# Patient Record
Sex: FEMALE | Race: WHITE | NOT HISPANIC OR LATINO | Employment: OTHER | URBAN - METROPOLITAN AREA
[De-identification: names, ages, dates, MRNs, and addresses within clinical notes are randomized per-mention and may not be internally consistent; named-entity substitution may affect disease eponyms.]

---

## 2023-05-16 PROBLEM — S83.249A MEDIAL MENISCUS TEAR: Status: ACTIVE | Noted: 2023-05-16

## 2023-05-16 PROBLEM — M54.50 PAIN, LOW BACK: Status: ACTIVE | Noted: 2023-05-16

## 2023-05-16 PROBLEM — N95.2 VAGINAL ATROPHY: Status: ACTIVE | Noted: 2023-05-16

## 2023-05-16 PROBLEM — R10.2 PELVIC PAIN IN FEMALE: Status: ACTIVE | Noted: 2023-05-16

## 2023-05-16 PROBLEM — M25.461 EFFUSION, RIGHT KNEE: Status: ACTIVE | Noted: 2023-05-16

## 2023-05-16 PROBLEM — M17.11 PRIMARY LOCALIZED OSTEOARTHROSIS OF RIGHT LOWER LEG: Status: ACTIVE | Noted: 2023-05-16

## 2023-05-16 PROBLEM — N81.89 PELVIC FLOOR WEAKNESS: Status: ACTIVE | Noted: 2023-05-16

## 2023-05-16 PROBLEM — M54.12 CERVICAL RADICULAR PAIN: Status: ACTIVE | Noted: 2023-05-16

## 2023-05-16 PROBLEM — R79.89 LOW VITAMIN D LEVEL: Status: ACTIVE | Noted: 2023-05-16

## 2023-05-16 PROBLEM — L30.9 ECZEMA: Status: ACTIVE | Noted: 2023-05-16

## 2023-05-16 PROBLEM — N39.41 URGE INCONTINENCE: Status: ACTIVE | Noted: 2023-05-16

## 2023-05-16 RX ORDER — TRIAMCINOLONE ACETONIDE 5 MG/G
CREAM TOPICAL
COMMUNITY
Start: 2021-08-20 | End: 2023-08-22 | Stop reason: SDUPTHER

## 2023-05-16 RX ORDER — OXYBUTYNIN CHLORIDE 10 MG/1
1 TABLET, EXTENDED RELEASE ORAL DAILY
COMMUNITY
Start: 2021-05-13 | End: 2023-11-20

## 2023-05-16 RX ORDER — ESTRADIOL 0.1 MG/G
CREAM VAGINAL
COMMUNITY
Start: 2021-05-13

## 2023-05-22 ENCOUNTER — OFFICE VISIT (OUTPATIENT)
Dept: PRIMARY CARE | Facility: CLINIC | Age: 63
End: 2023-05-22
Payer: COMMERCIAL

## 2023-05-22 VITALS
DIASTOLIC BLOOD PRESSURE: 70 MMHG | BODY MASS INDEX: 21.99 KG/M2 | HEART RATE: 60 BPM | HEIGHT: 65 IN | SYSTOLIC BLOOD PRESSURE: 116 MMHG | OXYGEN SATURATION: 97 % | WEIGHT: 132 LBS

## 2023-05-22 DIAGNOSIS — M79.89 LEFT LEG SWELLING: ICD-10-CM

## 2023-05-22 DIAGNOSIS — M54.50 CHRONIC MIDLINE LOW BACK PAIN, UNSPECIFIED WHETHER SCIATICA PRESENT: ICD-10-CM

## 2023-05-22 DIAGNOSIS — Z00.00 PREVENTATIVE HEALTH CARE: ICD-10-CM

## 2023-05-22 DIAGNOSIS — R79.89 LOW VITAMIN D LEVEL: ICD-10-CM

## 2023-05-22 DIAGNOSIS — M25.50 POLYARTHRALGIA: ICD-10-CM

## 2023-05-22 DIAGNOSIS — G89.29 CHRONIC MIDLINE LOW BACK PAIN, UNSPECIFIED WHETHER SCIATICA PRESENT: ICD-10-CM

## 2023-05-22 DIAGNOSIS — R25.2 MUSCLE CRAMPING: ICD-10-CM

## 2023-05-22 DIAGNOSIS — M25.552 BILATERAL HIP PAIN: Primary | ICD-10-CM

## 2023-05-22 DIAGNOSIS — M25.551 BILATERAL HIP PAIN: Primary | ICD-10-CM

## 2023-05-22 PROCEDURE — 99214 OFFICE O/P EST MOD 30 MIN: CPT | Performed by: FAMILY MEDICINE

## 2023-05-22 PROCEDURE — 1036F TOBACCO NON-USER: CPT | Performed by: FAMILY MEDICINE

## 2023-05-22 ASSESSMENT — PATIENT HEALTH QUESTIONNAIRE - PHQ9
SUM OF ALL RESPONSES TO PHQ9 QUESTIONS 1 AND 2: 0
1. LITTLE INTEREST OR PLEASURE IN DOING THINGS: NOT AT ALL
2. FEELING DOWN, DEPRESSED OR HOPELESS: NOT AT ALL

## 2023-05-22 NOTE — PROGRESS NOTES
"Subjective   Carlotta Mitchell is a 62 y.o. female who presents for Follow-up (Left calf swelling x 8 days /Went to ER ( was out of town ) - US negative for blood clot).    HPI  #) leg swelling   - never had this before  - left leg only   - went to the ER - US was neg for clot   - brother who is orthopedic, recemmned ER for US   - drank a lot of water and kept it elevated   - feeling like she was going to get a julio c horse the week leading up to it   - is having a lot of lower back and hip pains   - no numbness or pain in the legs     #) Incontinence and pelvic pain - no change in urination   - ovaries for RAD51 gene- Evelyn Ruggiero   - follows with uro/gyn  - also saw Dr Higgins     #) dermatitis   - cortisone cream is keeping stable.     #) lipoma on left forearm  - no numbness tingling or weakness     #) OA on the low back and knee  - follows with ortho and had PT     #) Preventive:  Smoker: never, quit more than 15 yrs ago   Etoh: occ  BMI: 22   BP: good   Fam h/o:  - Mom- alzheimer   - Dad- UC   - Siblings  Vaccinations:  - Flu- declined   - Tetanus- 2016   - Shingles- had   - Pneumonia - n/a til 64   - COVID- yes   PAP: 2019- due 2022  Mammogram: Feb 2021   Colonoscopy: Due in 2026   DEXA: at age 64   CT Cardiac score:   ASA 81:   Low Dose CT: n/a   HepC screen: now   Fasting blood work: now   Last eye exam: needs, lasik   Last dental Exam:  Diet:  Exercise: walks daily   Mood: good   Sleep: good   Occupation: electro plating   Supplements:    ROS was completed and all systems are negative with the exception of what was noted in the the HPI.     Objective     /70   Pulse 60   Ht 1.651 m (5' 5\")   Wt 59.9 kg (132 lb)   SpO2 97%   BMI 21.97 kg/m²      Physical Exam  Gen: A+O, NAD  HEENT: NC/AT, EOMI, no LAD, oropharynx clear, no edema or erythema, TM visualized and normal  CV: RRR, No M/R/G  Resp: CTAB, No W/R/C  Abd: Soft, NT/ND  Neuro: PERRL, moves all extremities equally, normal gait   Skin: No " rashes, no LE edema or varicosities   MSK: Grossly intact strength and reflexes; normal gait  Psych: Normal mood and affect    Assessment/Plan   Problem List Items Addressed This Visit    None    Repeat colonoscopy in 2026    Get updated mammogram with DR Whalen in July     Please get updated fasting blood work     Please get updated xrays of the low back and hips.     Continue the potassium and magnesium     Please set up a consultation with Dr Mohseni to look for deep varicose veins that could be addig to your swelling.   Please call [facility], Dr. Razieh Mohseni. There is an office in [place]. Call [phone]    Follow up in 3 months for a Physical and review all the testing done up to this point       Adrienne Omalley DO, MSMed, ABOM  [address]   [address]   [place] [address]  Ph. [phone]  Fx. [phone]

## 2023-05-22 NOTE — PATIENT INSTRUCTIONS
Repeat colonoscopy in 2026    Get updated mammogram with DR Whalen in July     Please get updated fasting blood work     Please get updated xrays of the low back and hips.     Continue the potassium and magnesium     Please set up a consultation with Dr Mohseni to look for deep varicose veins that could be addig to your swelling.   Please call [facility], Dr. Razieh Mohseni. There is an office in [place]. Call [phone]    Follow up in 3 months for a Physical and review all the testing done up to this point

## 2023-05-23 ENCOUNTER — LAB (OUTPATIENT)
Dept: LAB | Facility: LAB | Age: 63
End: 2023-05-23
Payer: COMMERCIAL

## 2023-05-23 DIAGNOSIS — M25.552 BILATERAL HIP PAIN: ICD-10-CM

## 2023-05-23 DIAGNOSIS — R25.2 MUSCLE CRAMPING: ICD-10-CM

## 2023-05-23 DIAGNOSIS — R79.89 LOW VITAMIN D LEVEL: ICD-10-CM

## 2023-05-23 DIAGNOSIS — M25.551 BILATERAL HIP PAIN: ICD-10-CM

## 2023-05-23 DIAGNOSIS — G89.29 CHRONIC MIDLINE LOW BACK PAIN, UNSPECIFIED WHETHER SCIATICA PRESENT: ICD-10-CM

## 2023-05-23 DIAGNOSIS — M54.50 CHRONIC MIDLINE LOW BACK PAIN, UNSPECIFIED WHETHER SCIATICA PRESENT: ICD-10-CM

## 2023-05-23 DIAGNOSIS — Z00.00 PREVENTATIVE HEALTH CARE: ICD-10-CM

## 2023-05-23 DIAGNOSIS — M25.50 POLYARTHRALGIA: ICD-10-CM

## 2023-05-23 LAB
ALANINE AMINOTRANSFERASE (SGPT) (U/L) IN SER/PLAS: 17 U/L (ref 7–45)
ALBUMIN (G/DL) IN SER/PLAS: 4.3 G/DL (ref 3.4–5)
ALKALINE PHOSPHATASE (U/L) IN SER/PLAS: 41 U/L (ref 33–136)
ANION GAP IN SER/PLAS: 12 MMOL/L (ref 10–20)
ASPARTATE AMINOTRANSFERASE (SGOT) (U/L) IN SER/PLAS: 20 U/L (ref 9–39)
BASOPHILS (10*3/UL) IN BLOOD BY AUTOMATED COUNT: 0.03 X10E9/L (ref 0–0.1)
BASOPHILS/100 LEUKOCYTES IN BLOOD BY AUTOMATED COUNT: 0.7 % (ref 0–2)
BILIRUBIN TOTAL (MG/DL) IN SER/PLAS: 0.7 MG/DL (ref 0–1.2)
CALCIDIOL (25 OH VITAMIN D3) (NG/ML) IN SER/PLAS: 49 NG/ML
CALCIUM (MG/DL) IN SER/PLAS: 9.1 MG/DL (ref 8.6–10.3)
CARBON DIOXIDE, TOTAL (MMOL/L) IN SER/PLAS: 30 MMOL/L (ref 21–32)
CHLORIDE (MMOL/L) IN SER/PLAS: 103 MMOL/L (ref 98–107)
CHOLESTEROL (MG/DL) IN SER/PLAS: 196 MG/DL (ref 0–199)
CHOLESTEROL IN HDL (MG/DL) IN SER/PLAS: 88 MG/DL
CHOLESTEROL/HDL RATIO: 2.2
CREATININE (MG/DL) IN SER/PLAS: 0.93 MG/DL (ref 0.5–1.05)
EOSINOPHILS (10*3/UL) IN BLOOD BY AUTOMATED COUNT: 0.26 X10E9/L (ref 0–0.7)
EOSINOPHILS/100 LEUKOCYTES IN BLOOD BY AUTOMATED COUNT: 6.5 % (ref 0–6)
ERYTHROCYTE DISTRIBUTION WIDTH (RATIO) BY AUTOMATED COUNT: 14.8 % (ref 11.5–14.5)
ERYTHROCYTE MEAN CORPUSCULAR HEMOGLOBIN CONCENTRATION (G/DL) BY AUTOMATED: 32.3 G/DL (ref 32–36)
ERYTHROCYTE MEAN CORPUSCULAR VOLUME (FL) BY AUTOMATED COUNT: 97 FL (ref 80–100)
ERYTHROCYTES (10*6/UL) IN BLOOD BY AUTOMATED COUNT: 3.82 X10E12/L (ref 4–5.2)
GFR FEMALE: 69 ML/MIN/1.73M2
GLUCOSE (MG/DL) IN SER/PLAS: 87 MG/DL (ref 74–99)
HEMATOCRIT (%) IN BLOOD BY AUTOMATED COUNT: 37.2 % (ref 36–46)
HEMOGLOBIN (G/DL) IN BLOOD: 12 G/DL (ref 12–16)
IMMATURE GRANULOCYTES/100 LEUKOCYTES IN BLOOD BY AUTOMATED COUNT: 0.7 % (ref 0–0.9)
LDL: 92 MG/DL (ref 0–99)
LEUKOCYTES (10*3/UL) IN BLOOD BY AUTOMATED COUNT: 4 X10E9/L (ref 4.4–11.3)
LYMPHOCYTES (10*3/UL) IN BLOOD BY AUTOMATED COUNT: 1.78 X10E9/L (ref 1.2–4.8)
LYMPHOCYTES/100 LEUKOCYTES IN BLOOD BY AUTOMATED COUNT: 44.2 % (ref 13–44)
MAGNESIUM (MG/DL) IN SER/PLAS: 1.93 MG/DL (ref 1.6–2.4)
MONOCYTES (10*3/UL) IN BLOOD BY AUTOMATED COUNT: 0.74 X10E9/L (ref 0.1–1)
MONOCYTES/100 LEUKOCYTES IN BLOOD BY AUTOMATED COUNT: 18.4 % (ref 2–10)
NEUTROPHILS (10*3/UL) IN BLOOD BY AUTOMATED COUNT: 1.19 X10E9/L (ref 1.2–7.7)
NEUTROPHILS/100 LEUKOCYTES IN BLOOD BY AUTOMATED COUNT: 29.5 % (ref 40–80)
PLATELETS (10*3/UL) IN BLOOD AUTOMATED COUNT: 253 X10E9/L (ref 150–450)
POTASSIUM (MMOL/L) IN SER/PLAS: 4.8 MMOL/L (ref 3.5–5.3)
PROTEIN TOTAL: 6.3 G/DL (ref 6.4–8.2)
RHEUMATOID FACTOR (IU/ML) IN SERUM OR PLASMA: 11 IU/ML (ref 0–15)
SODIUM (MMOL/L) IN SER/PLAS: 140 MMOL/L (ref 136–145)
THYROTROPIN (MIU/L) IN SER/PLAS BY DETECTION LIMIT <= 0.05 MIU/L: 1.38 MIU/L (ref 0.44–3.98)
TRIGLYCERIDE (MG/DL) IN SER/PLAS: 82 MG/DL (ref 0–149)
UREA NITROGEN (MG/DL) IN SER/PLAS: 20 MG/DL (ref 6–23)
VLDL: 16 MG/DL (ref 0–40)

## 2023-05-23 PROCEDURE — 80053 COMPREHEN METABOLIC PANEL: CPT

## 2023-05-23 PROCEDURE — 81381 HLA I TYPING 1 ALLELE HR: CPT

## 2023-05-23 PROCEDURE — 84443 ASSAY THYROID STIM HORMONE: CPT

## 2023-05-23 PROCEDURE — 86038 ANTINUCLEAR ANTIBODIES: CPT

## 2023-05-23 PROCEDURE — 82306 VITAMIN D 25 HYDROXY: CPT

## 2023-05-23 PROCEDURE — 86431 RHEUMATOID FACTOR QUANT: CPT

## 2023-05-23 PROCEDURE — 85025 COMPLETE CBC W/AUTO DIFF WBC: CPT

## 2023-05-23 PROCEDURE — 80061 LIPID PANEL: CPT

## 2023-05-23 PROCEDURE — 83735 ASSAY OF MAGNESIUM: CPT

## 2023-05-23 PROCEDURE — 36415 COLL VENOUS BLD VENIPUNCTURE: CPT

## 2023-05-24 LAB — ANTI-NUCLEAR ANTIBODY (ANA): NEGATIVE

## 2023-05-25 LAB — HLAB27 TYPING: NEGATIVE

## 2023-08-22 ENCOUNTER — OFFICE VISIT (OUTPATIENT)
Dept: PRIMARY CARE | Facility: CLINIC | Age: 63
End: 2023-08-22
Payer: COMMERCIAL

## 2023-08-22 VITALS
BODY MASS INDEX: 21.63 KG/M2 | DIASTOLIC BLOOD PRESSURE: 72 MMHG | SYSTOLIC BLOOD PRESSURE: 118 MMHG | WEIGHT: 130 LBS | HEART RATE: 64 BPM | OXYGEN SATURATION: 98 %

## 2023-08-22 DIAGNOSIS — L30.9 ECZEMA, UNSPECIFIED TYPE: Primary | ICD-10-CM

## 2023-08-22 DIAGNOSIS — F51.01 PRIMARY INSOMNIA: ICD-10-CM

## 2023-08-22 PROCEDURE — 99214 OFFICE O/P EST MOD 30 MIN: CPT | Performed by: FAMILY MEDICINE

## 2023-08-22 PROCEDURE — 1036F TOBACCO NON-USER: CPT | Performed by: FAMILY MEDICINE

## 2023-08-22 RX ORDER — TRAZODONE HYDROCHLORIDE 50 MG/1
50 TABLET ORAL NIGHTLY PRN
Qty: 30 TABLET | Refills: 5 | Status: SHIPPED | OUTPATIENT
Start: 2023-08-22 | End: 2023-11-08 | Stop reason: ALTCHOICE

## 2023-08-22 RX ORDER — TRIAMCINOLONE ACETONIDE 5 MG/G
CREAM TOPICAL DAILY PRN
Qty: 15 G | Refills: 1 | Status: SHIPPED | OUTPATIENT
Start: 2023-08-22

## 2023-08-22 NOTE — PROGRESS NOTES
Subjective   Carlotta Mitchell is a 62 y.o. female who presents for Follow-up (3 month follow up).    HPI  Father with SLE     #) leg swelling - better  - had vascular studies and were good     #) Incontinence and pelvic pain - no change in urination   - on oxybutinin   - ovaries for RAD51 gene- Evelyn Ruggiero   - follows with uro/gyn  - also saw Dr Higgins     #) dermatitis   - cortisone cream is keeping stable.     #) lipoma on left forearm  - no numbness tingling or weakness     #) OA on the low back and knee  - follows with ortho and had PT   - trying to do home exercises / stretches   - Ibuprofen as needed, always with golf   - xrays on on 5/22/23 - No acute findings of the lumbar spine and hips. Mild degenerative changes of the lumbar spine.    #) Preventive:  Smoker: never, quit more than 15 yrs ago   Etoh: occ  BMI: 22   BP: good   Fam h/o:  - Mom- alzheimer   - Dad- UC   - Siblings  Vaccinations:  - Flu- declined   - Tetanus- 2016   - Shingles- had   - Pneumonia - n/a til 64   - COVID- yes   PAP: 2019- due 2022  Mammogram: Feb 2021   Colonoscopy: Due in 2026   DEXA: at age 64   CT Cardiac score:   ASA 81:   Low Dose CT: n/a   HepC screen: now   Fasting blood work: now   Last eye exam: needs, lasik   Last dental Exam:  Diet:  Exercise: walks daily   Mood: good   Sleep: good   Occupation: electro plating   Supplements:    ROS was completed and all systems are negative with the exception of what was noted in the the HPI.     Objective     /72   Pulse 64   Wt 59 kg (130 lb)   SpO2 98%   BMI 21.63 kg/m²      Physical Exam  Gen: A+O, NAD  HEENT: NC/AT, EOMI, no LAD, oropharynx clear, no edema or erythema, TM visualized and normal  CV: RRR, No M/R/G  Resp: CTAB, No W/R/C  Abd: Soft, NT/ND  Neuro: PERRL, moves all extremities equally, normal gait   Skin: No rashes, no LE edema or varicosities   MSK: Grossly intact strength and reflexes; normal gait  Psych: Normal mood and affect    Assessment/Plan    Problem List Items Addressed This Visit    None  Repeat colonoscopy in 2026    Mammo was good on 7/28/23, repeat yearly.     Labs from 5/23/23 reviewed...  Electrolytes look good (negative for diabetes), protein and kidney levels are a little reduced, please increase hydration and protein in diet. Normal liver and thyroid function, cholesterol looks great. Normal magnesium levels. All the autoimmune markers/ inflammatory markers were negative. Vitamin D looks good.     xrays on on 5/22/23 - No acute findings of the lumbar spine and hips. Mild degenerative changes of the lumbar spine.    Continue the potassium and magnesium     Try to drink one Protein Water per day or increase egg whites/ tuna in water/ chicken breast     Follow up in 6 months  for Yearly Physical or sooner a needed.     Adrienne Omalley DO, MSMed, ABOM  [address]   [address]   [place] [address]  Ph. [phone]  Fx. [phone]

## 2023-08-22 NOTE — PATIENT INSTRUCTIONS
Repeat colonoscopy in 2026    Mammo was good on 7/28/23, repeat yearly.     Labs from 5/23/23 reviewed...  Electrolytes look good (negative for diabetes), protein and kidney levels are a little reduced, please increase hydration and protein in diet. Normal liver and thyroid function, cholesterol looks great. Normal magnesium levels. All the autoimmune markers/ inflammatory markers were negative. Vitamin D looks good.     xrays on on 5/22/23 - No acute findings of the lumbar spine and hips. Mild degenerative changes of the lumbar spine.    Continue the potassium and magnesium     Please try the trazodone as needed for insomnia     Try to drink one Protein Water per day or increase egg whites/ tuna in water/ chicken breast     Follow up in 6 months  for Yearly Physical or sooner a needed.

## 2023-11-08 ENCOUNTER — OFFICE VISIT (OUTPATIENT)
Dept: OBSTETRICS AND GYNECOLOGY | Facility: CLINIC | Age: 63
End: 2023-11-08
Payer: COMMERCIAL

## 2023-11-08 VITALS
HEIGHT: 65 IN | SYSTOLIC BLOOD PRESSURE: 126 MMHG | BODY MASS INDEX: 21.16 KG/M2 | DIASTOLIC BLOOD PRESSURE: 64 MMHG | WEIGHT: 127 LBS

## 2023-11-08 DIAGNOSIS — Z01.419 WELL WOMAN EXAM: Primary | ICD-10-CM

## 2023-11-08 PROCEDURE — 1036F TOBACCO NON-USER: CPT | Performed by: OBSTETRICS & GYNECOLOGY

## 2023-11-08 PROCEDURE — 99396 PREV VISIT EST AGE 40-64: CPT | Performed by: OBSTETRICS & GYNECOLOGY

## 2023-11-08 ASSESSMENT — ENCOUNTER SYMPTOMS
CHILLS: 0
CONSTIPATION: 0
FEVER: 0
ABDOMINAL DISTENTION: 0
UNEXPECTED WEIGHT CHANGE: 0
SHORTNESS OF BREATH: 0
FREQUENCY: 0
APPETITE CHANGE: 0
NAUSEA: 0
SLEEP DISTURBANCE: 0
BACK PAIN: 0
ABDOMINAL PAIN: 0
COLOR CHANGE: 0
HEMATURIA: 0
DIARRHEA: 0
FLANK PAIN: 0
VOMITING: 0
BLOOD IN STOOL: 0
DYSURIA: 0
FATIGUE: 0

## 2023-11-08 ASSESSMENT — PAIN SCALES - GENERAL: PAINLEVEL: 0-NO PAIN

## 2023-11-08 NOTE — PROGRESS NOTES
History Of Present Illness  Routine Gyn Exam  Carlotta Mitchell here for routine WWE.  Pt is postmenopausal.  Denies spotting or bleeding.   S/p risk reducing BSO   Taking oxybutynin for OAB.  Has Rx for estradiol vaginal cream but not using regularly. Uses OTC lubricant as needed.    Current complaints: none.     New health issues or surgeries since last visit: none.  Exercise: regular, walking, stretching, aerobics.     Gynecologic History  Postmenopausal.  Sexually active: Yes with one male partner/ .  Last Pap:  wnl .   Last mammogram: .   Last Colonoscopy:  up to date.   Last DEXA: not yet needed.     Obstetric History  OB History    Para Term  AB Living   5 5 5     5   SAB IAB Ectopic Multiple Live Births           5      # Outcome Date GA Lbr Alec/2nd Weight Sex Delivery Anes PTL Lv   5 Term 93    M Vag-Spont   JAYSON   4 Term 89    M Vag-Spont   JAYSON   3 Term 88    M Vag-Spont   JAYSON   2 Term 87    M Vag-Spont   JAYSON   1 Term 82    F Vag-Spont   JAYSON        Past Medical History  She has a past medical history of Abnormal cytological findings in specimens from other organs, systems and tissues (2014), Chondromalacia patellae, unspecified knee (2016), Chronic sinusitis, unspecified (2016), Disorder of the skin and subcutaneous tissue, unspecified (2016), Effusion, unspecified knee (2016), Encounter for gynecological examination (general) (routine) without abnormal findings (2016), Encounter for gynecological examination (general) (routine) without abnormal findings (2017), Encounter for other screening for malignant neoplasm of breast (2017), Encounter for screening mammogram for malignant neoplasm of breast (2016), Localized edema (10/18/2018), Localized swelling, mass and lump, right lower limb (2016), Menopausal and female climacteric states (2016), Menopausal and female climacteric states  "(09/07/2017), Other conditions influencing health status (09/26/2016), Other meniscus derangements, unspecified lateral meniscus, right knee (11/01/2016), Other meniscus derangements, unspecified medial meniscus, unspecified knee (04/11/2018), Pain in unspecified knee (01/08/2017), and Personal history of other medical treatment (09/07/2017).    Surgical History  She has a past surgical history that includes Tubal ligation (08/07/2014); Hysteroscopy (08/07/2014); Tonsillectomy (08/07/2014); Other surgical history (09/27/2022); Other surgical history (05/13/2021); Breast biopsy (09/18/2015); MR angio spine w IV contrast (09/16/2020); and Oophorectomy (2020).     Social History  She reports that she quit smoking about 20 years ago. Her smoking use included cigarettes. She has never used smokeless tobacco. She reports that she does not currently use alcohol after a past usage of about 3.0 standard drinks of alcohol per week. She reports that she does not use drugs.    Family History  No family history on file.     Allergies  Patient has no known allergies.    Review of Systems   Constitutional:  Negative for appetite change, chills, fatigue, fever and unexpected weight change.   Respiratory:  Negative for shortness of breath.    Cardiovascular:  Negative for chest pain.   Gastrointestinal:  Negative for abdominal distention, abdominal pain, blood in stool, constipation, diarrhea, nausea and vomiting.   Endocrine: Negative for cold intolerance and heat intolerance.   Genitourinary:  Negative for dyspareunia, dysuria, flank pain, frequency, genital sores, hematuria, menstrual problem, pelvic pain, urgency, vaginal bleeding, vaginal discharge and vaginal pain.   Musculoskeletal:  Negative for back pain.   Skin:  Negative for color change.   Psychiatric/Behavioral:  Negative for sleep disturbance.        /64   Ht 1.651 m (5' 5\")   Wt 57.6 kg (127 lb)   BMI 21.13 kg/m²      Physical Exam  Constitutional:       " "Appearance: Normal appearance.   HENT:      Head: Normocephalic and atraumatic.   Chest:   Breasts:     Right: Normal.      Left: Normal.   Abdominal:      General: Abdomen is flat.      Palpations: Abdomen is soft.      Tenderness: There is no abdominal tenderness.   Genitourinary:     General: Normal vulva.      Vagina: Normal.      Cervix: Normal.      Uterus: Normal.       Adnexa: Right adnexa normal and left adnexa normal.   Skin:     General: Skin is warm and dry.   Neurological:      Mental Status: She is alert and oriented to person, place, and time.   Psychiatric:         Mood and Affect: Mood normal.          Last Recorded Vitals  Blood pressure 126/64, height 1.651 m (5' 5\"), weight 57.6 kg (127 lb).         Assessment/Plan         Routine Well Woman Exam Today  Discussed diet and exercise.   Reviewed routine health screenings.   Pap: 2022 wnl   Recommend annual mammograms. Pt up to date  Up to date on colon cancer screening.                Mariama Whalen MD  "

## 2023-11-19 DIAGNOSIS — N39.41 URGE INCONTINENCE: ICD-10-CM

## 2023-11-20 RX ORDER — DEXAMETHASONE SODIUM PHOSPHATE 1 MG/ML
1 SOLUTION/ DROPS OPHTHALMIC 4 TIMES DAILY
COMMUNITY
Start: 2015-05-21 | End: 2024-03-12 | Stop reason: WASHOUT

## 2023-11-20 RX ORDER — MOXIFLOXACIN 5 MG/ML
1 SOLUTION/ DROPS OPHTHALMIC 4 TIMES DAILY
COMMUNITY
Start: 2015-05-21 | End: 2024-03-12 | Stop reason: WASHOUT

## 2023-11-20 RX ORDER — NEOMYCIN SULFATE, POLYMYXIN B SULFATE, AND DEXAMETHASONE 3.5; 10000; 1 MG/G; [USP'U]/G; MG/G
1 OINTMENT OPHTHALMIC
COMMUNITY
Start: 2015-03-27 | End: 2024-03-12 | Stop reason: WASHOUT

## 2023-11-20 RX ORDER — CARBOXYMETHYLCELLULOSE SODIUM 5 MG/ML
1 SOLUTION/ DROPS OPHTHALMIC
COMMUNITY
Start: 2015-05-21 | End: 2024-03-12 | Stop reason: WASHOUT

## 2023-11-20 RX ORDER — METHYLPREDNISOLONE 4 MG/1
TABLET ORAL
COMMUNITY
Start: 2023-10-10 | End: 2024-03-12 | Stop reason: WASHOUT

## 2023-11-20 RX ORDER — OXYBUTYNIN CHLORIDE 10 MG/1
10 TABLET, EXTENDED RELEASE ORAL DAILY
Qty: 30 TABLET | Refills: 11 | Status: SHIPPED | OUTPATIENT
Start: 2023-11-20

## 2023-11-20 RX ORDER — DIAZEPAM 5 MG/1
5 TABLET ORAL
COMMUNITY
Start: 2015-05-20 | End: 2024-03-12 | Stop reason: WASHOUT

## 2023-11-27 ENCOUNTER — OFFICE VISIT (OUTPATIENT)
Dept: DERMATOLOGY | Facility: CLINIC | Age: 63
End: 2023-11-27
Payer: COMMERCIAL

## 2023-11-27 DIAGNOSIS — L57.9 SKIN CHANGES DUE TO CHRONIC EXPOSURE TO NONIONIZING RADIATION: ICD-10-CM

## 2023-11-27 DIAGNOSIS — L82.1 SEBORRHEIC KERATOSIS: ICD-10-CM

## 2023-11-27 DIAGNOSIS — C44.91 BASAL CELL CARCINOMA (BCC), UNSPECIFIED SITE: Primary | ICD-10-CM

## 2023-11-27 DIAGNOSIS — D22.9 BENIGN NEVUS: ICD-10-CM

## 2023-11-27 DIAGNOSIS — D48.5 NEOPLASM OF UNCERTAIN BEHAVIOR OF SKIN: ICD-10-CM

## 2023-11-27 DIAGNOSIS — L81.4 LENTIGO: ICD-10-CM

## 2023-11-27 DIAGNOSIS — D18.01 ANGIOMA OF SKIN: ICD-10-CM

## 2023-11-27 DIAGNOSIS — Z85.828 HISTORY OF NONMELANOMA SKIN CANCER: ICD-10-CM

## 2023-11-27 PROCEDURE — 1036F TOBACCO NON-USER: CPT | Performed by: NURSE PRACTITIONER

## 2023-11-27 PROCEDURE — 88305 TISSUE EXAM BY PATHOLOGIST: CPT | Performed by: DERMATOLOGY

## 2023-11-27 PROCEDURE — 88305 TISSUE EXAM BY PATHOLOGIST: CPT | Mod: TC,DER | Performed by: NURSE PRACTITIONER

## 2023-11-27 PROCEDURE — 11102 TANGNTL BX SKIN SINGLE LES: CPT | Performed by: NURSE PRACTITIONER

## 2023-11-27 PROCEDURE — 99213 OFFICE O/P EST LOW 20 MIN: CPT | Performed by: NURSE PRACTITIONER

## 2023-11-27 NOTE — PROGRESS NOTES
Subjective     Carlotta Mitchell is a 62 y.o. female who presents for the following: Skin Check.     Review of Systems:  No other skin or systemic complaints other than what is documented elsewhere in the note.    The following portions of the chart were reviewed this encounter and updated as appropriate:   Tobacco  Allergies  Meds  Problems  Med Hx  Surg Hx         Skin Cancer History  No skin cancer on file.      Specialty Problems          Dermatology Problems    Eczema        Objective   Well appearing patient in no apparent distress; mood and affect are within normal limits.    A full examination was performed including scalp, head, eyes, ears, nose, lips, neck, chest, axillae, abdomen, back, buttocks, bilateral upper extremities, bilateral lower extremities, hands, feet, fingers, toes, fingernails, and toenails. All findings within normal limits unless otherwise noted below.      Assessment/Plan   1. Neoplasm of uncertain behavior of skin  Right Breast  5.5 x 3.5 mm irregular shape pink papule with arborizing telangiectasias                   Lesion biopsy  Type of biopsy: tangential    Informed consent: discussed and consent obtained    Timeout: patient name, date of birth, surgical site, and procedure verified    Procedure prep:  Patient was prepped and draped  Anesthesia: the lesion was anesthetized in a standard fashion    Anesthetic:  1% lidocaine plain local infiltration  Instrument used: DermaBlade    Hemostasis achieved with: electrodesiccation    Outcome: patient tolerated procedure well    Post-procedure details: wound care instructions given    Additional details:  Cleaned area with isopropyl alcohol prior to anesthesia or biopsy. Applied thin layer of vaseline and covered with bandaid after procedure      Staff Communication: Dermatology Local Anesthesia: 1 % Plain Lidocaine - Amount:0.5 ml    Specimen 1 - Dermatopathology- DERM LAB  Differential Diagnosis: NMSC  Check Margins Yes/No?:     Comments:    Dermpath Lab: Routine Histopathology (formalin-fixed tissue)    NUB  - Given uncertainty in clinical diagnosis, shave biopsy is recommended in clinic today.  - The patient expressed understanding, is in agreement with this plan, and wishes to proceed with biopsy.  - Oral and written wound care instructions provided.  - Advised patient that the office will call within 2 weeks to discuss biopsy results.      2. Angioma of skin  Scattered cherry-red papule(s).    A cherry hemangioma is a small macule (small, flat, smooth area) or papule (small, solid bump) formed from an overgrowth of tiny blood vessels in the skin. Cherry hemangiomas are characteristically red or purplish in color. They often first appear in middle adulthood and usually increase in number with age. Cherry hemangiomas are noncancerous (benign) and are common in adults.    The present appearance of the lesion is not worrisome but it should continue to be observed and testing/treatment may be warranted if change occurs.    3. Benign nevus  Scattered, uniform and benign-appearing, regular brown melanocytic papules and macules.    The present appearance of the lesion is not worrisome but it should continue to be observed and testing/treatment may be warranted if change occurs.    4. Seborrheic keratosis  Stuck on verrucous, tan-brown papules and plaques.      Seborrheic keratoses are common noncancerous (benign) growths of unknown cause seen in adults due to a thickening of an area of the top skin layer. Seborrheic keratoses may appear as if they are stuck on to the skin. They have distinct borders, and they may appear as papules (small, solid bumps) or plaques (solid, raised patches that are bigger than a thumbnail). They may be the same color as your skin, or they may be pink, light brown, darker brown, or very dark brown, or sometimes may appear black.    There is no way to prevent new seborrheic keratoses from forming. Seborrheic  keratoses can be removed, but removal is considered a cosmetic issue and is usually not covered by insurance.    PLAN  No treatment is needed unless there is irritation from clothing, such as itching or bleeding.  2.   Some lotions containing alpha hydroxy acids, salicylic acid, or urea may make the areas feel smoother with regular use but will not eliminate them.    5. Lentigo  Scattered tan macules in sun-exposed areas.    A solar lentigo (plural, solar lentigines), also known as a sun-induced freckle or senile lentigo, is a dark (hyperpigmented) lesion caused by natural or artificial ultraviolet (UV) light. Solar lentigines may be single or multiple. This type of lentigo is different from a simple lentigo (lentigo simplex) because it is caused by exposure to UV light. Solar lentigines are benign, but they do indicate excessive sun exposure, a risk factor for the development of skin cancer.    To prevent solar lentigines, avoid exposure to sunlight in midday (10 AM to 3 PM), wear sun-protective clothing (tightly woven clothes and hats), and apply sunscreen (SPF 30 UVA and UVB block).    The present appearance of the lesion is not worrisome but it should continue to be observed and testing/treatment may be warranted if change occurs.    6. Skin changes due to chronic exposure to nonionizing radiation  Actinic changes in the form of freckles, lentigines and hyper/hypopigmentation     ABCDEs of melanoma and atypical moles were discussed with the patient.    Patient was instructed to perform monthly self skin examination.  We recommended that the patient have regular full skin exams given an increased risk of subsequent skin cancers.    The patient was instructed to use sun protective behaviors including use of broad spectrum sunscreens and sun protective clothing to reduce risk of skin cancers.    Warning signs of non-melanoma skin cancer discussed.    7. History of nonmelanoma skin cancer    ABCDEs of melanoma and  atypical moles were discussed with the patient.    Patient was instructed to perform monthly self skin examination.  We recommended that the patient have regular full skin exams given an increased risk of subsequent skin cancers.    The patient was instructed to use sun protective behaviors including use of broad spectrum sunscreens and sun protective clothing to reduce risk of skin cancers.    Warning signs of non-melanoma skin cancer discussed.          Return in 6 months for routine skin check or return to clinic sooner if needed

## 2023-11-27 NOTE — PATIENT INSTRUCTIONS

## 2023-11-29 LAB
LABORATORY COMMENT REPORT: NORMAL
PATH REPORT.FINAL DX SPEC: NORMAL
PATH REPORT.GROSS SPEC: NORMAL
PATH REPORT.MICROSCOPIC SPEC OTHER STN: NORMAL
PATH REPORT.RELEVANT HX SPEC: NORMAL
PATH REPORT.TOTAL CANCER: NORMAL

## 2023-11-30 ENCOUNTER — TELEPHONE (OUTPATIENT)
Dept: DERMATOLOGY | Facility: CLINIC | Age: 63
End: 2023-11-30
Payer: COMMERCIAL

## 2023-11-30 NOTE — TELEPHONE ENCOUNTER
Skin cancer. Order placed for referral to surgery for treatment. Return to clinic in 6 months for recheck.

## 2023-12-04 ENCOUNTER — PROCEDURE VISIT (OUTPATIENT)
Dept: DERMATOLOGY | Facility: CLINIC | Age: 63
End: 2023-12-04
Payer: COMMERCIAL

## 2023-12-04 DIAGNOSIS — C44.511 BASAL CELL CARCINOMA (BCC) OF SKIN OF RIGHT BREAST: ICD-10-CM

## 2023-12-04 PROCEDURE — 88305 TISSUE EXAM BY PATHOLOGIST: CPT | Mod: TC,DER | Performed by: STUDENT IN AN ORGANIZED HEALTH CARE EDUCATION/TRAINING PROGRAM

## 2023-12-04 PROCEDURE — 88312 SPECIAL STAINS GROUP 1: CPT | Performed by: DERMATOLOGY

## 2023-12-04 PROCEDURE — 12032 INTMD RPR S/A/T/EXT 2.6-7.5: CPT | Performed by: STUDENT IN AN ORGANIZED HEALTH CARE EDUCATION/TRAINING PROGRAM

## 2023-12-04 PROCEDURE — 88305 TISSUE EXAM BY PATHOLOGIST: CPT | Performed by: DERMATOLOGY

## 2023-12-04 PROCEDURE — 11602 EXC TR-EXT MAL+MARG 1.1-2 CM: CPT | Performed by: STUDENT IN AN ORGANIZED HEALTH CARE EDUCATION/TRAINING PROGRAM

## 2023-12-04 PROCEDURE — 88312 SPECIAL STAINS GROUP 1: CPT | Mod: TC,DER | Performed by: STUDENT IN AN ORGANIZED HEALTH CARE EDUCATION/TRAINING PROGRAM

## 2023-12-04 NOTE — PROGRESS NOTES
Excision Operative Note    Date of Surgery:  12/4/2023  Surgeon:  Steven Hernandez DO  Office Location: DO [address] [facility]  [address]  [facility] [address]  [facility] [address]  Dept: [phone]  Dept Fax: [phone]  Referring Provider: Malcom Aguilar, APRN-CNP  [address] [facility] [address]  [place] [address]    Subjective   Carlotta Mitchell is a 62 y.o. female who presents for the following: Excision for basal cell carcinoma.    According to the patient, the lesion has been present for approximately 1 month at the time of diagnosis.  The lesion is not causing symptoms.  The lesion has not been treated previously.    The patient does not have a pacemaker / defibrillator.  The patient does have a heart valve / joint replacement.    The patient is not on blood thinners.   The patient does not have a history of hepatitis B or C.  The patient does not have a history of HIV.  The patient does not have a history of immunosuppression (e.g. organ transplantation, malignancy, medications)    The following portions of the chart were reviewed this encounter and updated as appropriate:         Assessment/Plan   Pre-procedure:   Obtained informed consent: written from patient  The surgical site was identified and confirmed with the patient.     Intra-operative:   Audible time out called at : 3:04 PM 12/04/23  by: Kavin Romano MA   Verified patient name, birthdate, site, specimen bottle label & requisition.    The planned procedure(s) was again reviewed with the patient. The risks of bleeding, infection, nerve damage and scarring were reviewed. The patient identity, surgical site, and planned procedure(s) were verified.     Basal cell carcinoma (BCC) of skin of right breast  Right Breast    Skin excision    Lesion length (cm):  1  Lesion width (cm):  1  Margin per side (cm):  0.5  Total excision diameter (cm):  2  Informed consent: discussed and consent obtained    Timeout: patient name, date of  birth, surgical site, and procedure verified    Procedure prep:  Patient prepped in sterile fashion  Anesthesia: the lesion was anesthetized in a standard fashion    Local anesthetic: 1.5% Lidocaine with Epi.  Instrument used: #15 blade    Hemostasis achieved with: electrodesiccation    Outcome: patient tolerated procedure well with no complications    Post-procedure details: sterile dressing applied and wound care instructions given    Dressing type: pressure dressing    Additional details:  The nature of the diagnosis was explained. The lesion is a skin cancer.  It is locally aggressive but has a very low to non-existent risk of spreading.  The condition is associated with sun exposure.  Warning signs of non-melanoma skin cancer discussed. Patient was instructed to perform monthly self skin examination.  We recommended that the patient have regular full skin exams given an increased risk of subsequent skin cancers. The patient was instructed to use sun protective behaviors including use of broad spectrum sunscreens and sun protective clothing to reduce risk of skin cancers.  Excision was discussed with the patient. The risks, benefits and potential adverse effects were reviewed. Discussion included but was not limited to the cure rate, relative cost, wound care requirements, activity restrictions, likely scar outcome and time to heal were reviewed. It was explained that the scar would be longer than the original lesion.  The patient elected to proceed with exicision today.    Skin repair  Complexity:  Intermediate  Final length (cm):  3.5  Informed consent: discussed and consent obtained    Timeout: patient name, date of birth, surgical site, and procedure verified    Procedure prep:  Patient prepped in sterile fashion  Anesthesia: the lesion was anesthetized in a standard fashion    Local anesthetic: 1.5% lidocaine with Epi.  Reason for type of repair: preserve normal anatomical and functional relationships     Undermining: edges undermined    Subcutaneous layers (deep stitches):   Suture size:  4-0  Suture type: Vicryl (polyglactin 910)    Stitches:  Buried vertical mattress  Fine/surface layer approximation (top stitches):   Suture size:  5-0  Suture type: fast-absorbing plain gut    Stitches: simple running    Hemostasis achieved with: suture, pressure and electrodesiccation  Outcome: patient tolerated procedure well with no complications    Post-procedure details: sterile dressing applied and wound care instructions given    Dressing type: pressure dressing      Specimen 1 - Dermatopathology- DERM LAB  Differential Diagnosis: BCC  Check Margins Yes  Comments:    Dermpath Lab: Routine Histopathology (formalin-fixed tissue)      Intermediate Linear Repair:  Given the location and size of the defect, it was determined that an intermediate layered linear closure was required to restore normal anatomy and function. The repair is an intermediate closure as two layers of sutures were required. The defect was undermined extensively at the level of the subcutaneous plane. Standing cutaneous cones were removed using Burow's triangles. The wound edges were brought into close approximation with buried vertical mattress sutures. The remainder of the wound was then closed with epidermal top sutures.    The final repair measured 3.5 cm      Wound care was discussed, and the patient was given written post-operative wound care instructions.      The patient will follow up with Steven Hernandez DO as needed for any post operative problems or concerns, and will follow up with their primary dermatologist as scheduled.

## 2023-12-08 ENCOUNTER — TELEPHONE (OUTPATIENT)
Dept: DERMATOLOGY | Facility: CLINIC | Age: 63
End: 2023-12-08
Payer: COMMERCIAL

## 2023-12-08 LAB
LABORATORY COMMENT REPORT: NORMAL
PATH REPORT.FINAL DX SPEC: NORMAL
PATH REPORT.GROSS SPEC: NORMAL
PATH REPORT.RELEVANT HX SPEC: NORMAL
PATH REPORT.TOTAL CANCER: NORMAL

## 2023-12-27 ENCOUNTER — TELEPHONE (OUTPATIENT)
Dept: DERMATOLOGY | Facility: CLINIC | Age: 63
End: 2023-12-27
Payer: COMMERCIAL

## 2023-12-27 DIAGNOSIS — C44.511 BASAL CELL CARCINOMA (BCC) OF SKIN OF RIGHT BREAST: Primary | ICD-10-CM

## 2023-12-27 RX ORDER — MUPIROCIN 20 MG/G
OINTMENT TOPICAL DAILY
Qty: 20 G | Refills: 2 | Status: SHIPPED | OUTPATIENT
Start: 2023-12-27 | End: 2024-01-26

## 2023-12-27 RX ORDER — MUPIROCIN CALCIUM 20 MG/G
CREAM TOPICAL DAILY
Qty: 15 G | Refills: 0 | Status: CANCELLED | OUTPATIENT
Start: 2023-12-27 | End: 2024-01-03

## 2023-12-27 NOTE — TELEPHONE ENCOUNTER
Patient called stating part of her wound seems to not be healing. She also stated there is no signs of infection. I advised her to send a photo of the site. Photo sent to Dr. Hernandez for review. Waiting on response.  -----------------------------------------------------  Dr. Hernandez reviewed photo and want patient to start Mupirocin ointment 1-2 times daily for 7 days. Patient notified and expressed understanding.

## 2024-01-02 NOTE — TELEPHONE ENCOUNTER
"Spoke with patient today and she reports the wound had been healing well for the first 2-3 weeks but then developed an area of opening which looked \"like a hole\" and initially enlarged. In the past week it has not worsened significantly. Advised that her story is consistent with a spitting suture and is not concerning for infection or poor healing outcomes. Recommended she continue with mupirocin ointment to the area daily and we will evaluate on Thursday when we see her  in clinic.   "

## 2024-01-04 ENCOUNTER — OFFICE VISIT (OUTPATIENT)
Dept: DERMATOLOGY | Facility: CLINIC | Age: 64
End: 2024-01-04
Payer: COMMERCIAL

## 2024-01-04 DIAGNOSIS — Z51.89 VISIT FOR WOUND CHECK: Primary | ICD-10-CM

## 2024-01-04 PROCEDURE — 1036F TOBACCO NON-USER: CPT | Performed by: DERMATOLOGY

## 2024-01-04 PROCEDURE — 99024 POSTOP FOLLOW-UP VISIT: CPT | Performed by: DERMATOLOGY

## 2024-02-22 ENCOUNTER — APPOINTMENT (OUTPATIENT)
Dept: PRIMARY CARE | Facility: CLINIC | Age: 64
End: 2024-02-22
Payer: COMMERCIAL

## 2024-03-12 ENCOUNTER — OFFICE VISIT (OUTPATIENT)
Dept: PRIMARY CARE | Facility: CLINIC | Age: 64
End: 2024-03-12
Payer: COMMERCIAL

## 2024-03-12 VITALS
WEIGHT: 127 LBS | HEART RATE: 64 BPM | BODY MASS INDEX: 21.13 KG/M2 | DIASTOLIC BLOOD PRESSURE: 74 MMHG | SYSTOLIC BLOOD PRESSURE: 118 MMHG | OXYGEN SATURATION: 100 %

## 2024-03-12 DIAGNOSIS — L91.0 HYPERTROPHIC SCAR OF SKIN: ICD-10-CM

## 2024-03-12 DIAGNOSIS — Z00.00 PREVENTATIVE HEALTH CARE: Primary | ICD-10-CM

## 2024-03-12 DIAGNOSIS — R79.89 LOW VITAMIN D LEVEL: ICD-10-CM

## 2024-03-12 DIAGNOSIS — Z12.31 ENCOUNTER FOR SCREENING MAMMOGRAM FOR MALIGNANT NEOPLASM OF BREAST: ICD-10-CM

## 2024-03-12 PROCEDURE — 11900 INJECT SKIN LESIONS </W 7: CPT | Performed by: FAMILY MEDICINE

## 2024-03-12 PROCEDURE — 1036F TOBACCO NON-USER: CPT | Performed by: FAMILY MEDICINE

## 2024-03-12 PROCEDURE — 99396 PREV VISIT EST AGE 40-64: CPT | Performed by: FAMILY MEDICINE

## 2024-03-12 RX ORDER — TRIAMCINOLONE ACETONIDE 40 MG/ML
10 INJECTION, SUSPENSION INTRA-ARTICULAR; INTRAMUSCULAR ONCE
Status: COMPLETED | OUTPATIENT
Start: 2024-03-12 | End: 2024-03-12

## 2024-03-12 RX ORDER — AMOXICILLIN 500 MG/1
CAPSULE ORAL
COMMUNITY
Start: 2024-03-06

## 2024-03-12 RX ADMIN — TRIAMCINOLONE ACETONIDE 10 MG: 40 INJECTION, SUSPENSION INTRA-ARTICULAR; INTRAMUSCULAR at 13:51

## 2024-03-12 ASSESSMENT — PATIENT HEALTH QUESTIONNAIRE - PHQ9
2. FEELING DOWN, DEPRESSED OR HOPELESS: NOT AT ALL
1. LITTLE INTEREST OR PLEASURE IN DOING THINGS: NOT AT ALL
SUM OF ALL RESPONSES TO PHQ9 QUESTIONS 1 AND 2: 0

## 2024-03-12 NOTE — PATIENT INSTRUCTIONS
For the hypertrophic scar, I injected a small amt of kenalog ( 10 mg) into the scar.   Gentle massage of the area  Follow up with Malcom Aguilar in May 2024 as scheduled     Repeat colonoscopy in 2026    Tetanus booster is due in 2026,     Mammo was good on 7/28/23, repeat yearly. New order given today    Labs from 5/23/23 reviewed...  Electrolytes look good (negative for diabetes), protein and kidney levels are a little reduced, please increase hydration and protein in diet. Normal liver and thyroid function, cholesterol looks great. Normal magnesium levels. All the autoimmune markers/ inflammatory markers were negative. Vitamin D looks good.    Recheck the Fasting blood work in May 2024, orders given today     xrays on on 5/22/23 - No acute findings of the lumbar spine and hips. Mild degenerative changes of the lumbar spine. Keep up with the stretching.     Continue the potassium and magnesium     Please try the trazodone as needed for insomnia as needed.     Try to drink one Protein Water per day or increase egg whites/ tuna in water/ chicken breast     Follow up in 1 year for your Yearly Physical or sooner a needed.

## 2024-03-12 NOTE — PROGRESS NOTES
Subjective   Patient is a 63 y.o. female presents for yearly physical examination.     Father with SLE     #) hypertrophic scar   - had a basal cell skin cancer removed in December 4. 2023  - then dehiscence and was Rx mupirocin   - scar is thick and in a cosmetic area of the chest where a low cut shirt would show   - pt agreeable to try a small kenalog injection ( 10 mg) into the scar)   #) leg swelling - better  - had vascular studies and were good   Patient ID: Carlotta Mitchell is a 63 y.o. female.    ProceduresIntralesional Injection Procedure Note  Diagnosis: hypertrophic scar  Location:  chest  Informed Consent: Discussed risks (infection, pain, bleeding, bruising, thinning of the skin, loss of skin pigment, lack of resolution, and recurrence of lesion) and benefits of the procedure, as well as the alternatives. Informed consent was obtained.  Preparation: The area was prepared a standard fashion.  Anesthesia: not required  Procedure Details: An intralesional injection was performed with Kenalog 40 mg/cc. 0.25 cc in total were injected.  Total number of lesions injected: 1  Plan: The patient was instructed on post-op care. Recommend OTC analgesia as needed for pain.      #) Incontinence and pelvic pain - no change in urination   - on oxybutinin and it is helping   - ovaries for RAD51 gene- Evelyn Ruggiero   - follows with uro/gyn  - also saw Dr Higgins , in the past     #) dermatitis behind the left ear  - cortisone cream is keeping stable.     #) lipoma on left forearm  - no numbness tingling or weakness     #) OA on the low back and knee- stable  - follows with ortho and had PT   - trying to do home exercises / stretches   - Ibuprofen as needed, always with golf   - xrays on on 5/22/23 - No acute findings of the lumbar spine and hips. Mild degenerative changes of the lumbar spine.    #) Preventive:  Smoker: never, quit more than 15 yrs ago   Etoh: occ  BMI: 22   BP: good  118/74   Fam h/o:  - Mom- alzheimer    - Dad- UC   - Siblings  Vaccinations:  - Flu- declined   - Tetanus- 2016   - Shingles- had   - Pneumonia - n/a til 64   - COVID- yes   PAP: 2019- due 2022  Mammogram: Feb 2021   Colonoscopy: Due in 2026   DEXA: at age 64   CT Cardiac score:   ASA 81:   Low Dose CT: n/a   HepC screen: now   Fasting blood work: now   Last eye exam: needs, lasik   Last dental Exam:  Diet:  Exercise: walks daily   Mood: good   Sleep: good   Occupation: electro plating   Supplements:    Review of system are negative unless otherwise stated in the HPI.     Objective     /74   Pulse 64   Wt 57.6 kg (127 lb)   SpO2 100%   BMI 21.13 kg/m²     Physical Examination  GEN: A+O, no acute distress  HEENT: NC/AT, Oropharynx clear, no exudates, TM visualized, Extraoccular muscles intact, no facial droop; no thyromegaly or cervical LAD  RESP: CTAB, no wheezes   CV: RRR, no murmurs  ABD: soft, non-tender, + BS  SKIN: no rashes or bruising, no peripheral edema   NEURO: CN II-XII grossly intact, moves all extremities equally, no tremor   PSYCH: normal affect, appropriate mood     Assessment/Plan     Active Problems:  There are no active Hospital Problems.    Repeat colonoscopy in 2026    Tetanus booster is due in 2026,     Mammo was good on 7/28/23, repeat yearly. New order given today    Labs from 5/23/23 reviewed...  Electrolytes look good (negative for diabetes), protein and kidney levels are a little reduced, please increase hydration and protein in diet. Normal liver and thyroid function, cholesterol looks great. Normal magnesium levels. All the autoimmune markers/ inflammatory markers were negative. Vitamin D looks good.    Recheck the Fasting blood work in May 2024, orders given today     xrays on on 5/22/23 - No acute findings of the lumbar spine and hips. Mild degenerative changes of the lumbar spine. Keep up with the stretching.     Continue the potassium and magnesium     Please try the trazodone as needed for insomnia as needed.      Try to drink one Protein Water per day or increase egg whites/ tuna in water/ chicken breast     Follow up in 1 year for your Yearly Physical or sooner a needed.

## 2024-04-17 ENCOUNTER — TELEPHONE (OUTPATIENT)
Dept: DERMATOLOGY | Facility: CLINIC | Age: 64
End: 2024-04-17
Payer: COMMERCIAL

## 2024-04-17 NOTE — TELEPHONE ENCOUNTER
She had to cancel her appt 5/17 and isnt scheduled again until August.  Can she be put on a waiting list?

## 2024-05-15 ENCOUNTER — APPOINTMENT (OUTPATIENT)
Dept: RADIOLOGY | Facility: CLINIC | Age: 64
End: 2024-05-15
Payer: COMMERCIAL

## 2024-05-17 ENCOUNTER — APPOINTMENT (OUTPATIENT)
Dept: DERMATOLOGY | Facility: CLINIC | Age: 64
End: 2024-05-17
Payer: COMMERCIAL

## 2024-06-07 ENCOUNTER — OFFICE VISIT (OUTPATIENT)
Dept: DERMATOLOGY | Facility: CLINIC | Age: 64
End: 2024-06-07
Payer: COMMERCIAL

## 2024-06-07 DIAGNOSIS — Z85.828 HISTORY OF NONMELANOMA SKIN CANCER: ICD-10-CM

## 2024-06-07 DIAGNOSIS — L82.1 SEBORRHEIC KERATOSIS: ICD-10-CM

## 2024-06-07 DIAGNOSIS — L81.4 LENTIGO: ICD-10-CM

## 2024-06-07 DIAGNOSIS — R23.4 FISSURE IN SKIN: ICD-10-CM

## 2024-06-07 DIAGNOSIS — D18.01 ANGIOMA OF SKIN: ICD-10-CM

## 2024-06-07 DIAGNOSIS — D48.5 NEOPLASM OF UNCERTAIN BEHAVIOR OF SKIN: ICD-10-CM

## 2024-06-07 DIAGNOSIS — L57.9 SKIN CHANGES DUE TO CHRONIC EXPOSURE TO NONIONIZING RADIATION: ICD-10-CM

## 2024-06-07 DIAGNOSIS — L91.0 HYPERTROPHIC SCAR: Primary | ICD-10-CM

## 2024-06-07 DIAGNOSIS — D22.9 BENIGN NEVUS: ICD-10-CM

## 2024-06-07 PROCEDURE — 99213 OFFICE O/P EST LOW 20 MIN: CPT | Performed by: NURSE PRACTITIONER

## 2024-06-07 PROCEDURE — 1036F TOBACCO NON-USER: CPT | Performed by: NURSE PRACTITIONER

## 2024-06-07 NOTE — PATIENT INSTRUCTIONS

## 2024-06-07 NOTE — PROGRESS NOTES
Subjective     Carlotta Mitchell is a 63 y.o. female who presents for the following: Skin Check.     Review of Systems:  No other skin or systemic complaints other than what is documented elsewhere in the note.    The following portions of the chart were reviewed this encounter and updated as appropriate:   Tobacco  Allergies  Meds  Problems  Med Hx  Surg Hx         Skin Cancer History  Biopsy Date Type Location Status   11/27/23 BCC Right Breast Refer for Excision  12/26/23       Specialty Problems          Dermatology Problems    Eczema        Objective   Well appearing patient in no apparent distress; mood and affect are within normal limits.    A full examination was performed including scalp, head, eyes, ears, nose, lips, neck, chest, axillae, abdomen, back, buttocks, bilateral upper extremities, bilateral lower extremities, hands, feet, fingers, toes, fingernails, and toenails. All findings within normal limits unless otherwise noted below.      Assessment/Plan   1. Hypertrophic scar  Right Breast  Thickened hypertrophic fleshed tone to pink scar    Discussed benign nature of hypertrophic scars and that hypertrophic scars can sometimes be painful or itchy.  Discussed conservative treatment options includes observation and gentle massaging of the area with vaseline or vitamin E oil on a routine basis to support softening of the scar over a period of time. If that fails to improve, topical steroids or intra-lesional steroid (kenalog) injections can be used to flatten out the scar. Side effects of intra-lesional kenalog injections include depressed scar, stretch marks, discoloration (lighter colored skin), pain with injection, and low risk of infection. These changes are not expected based on the dose and amount of medication to be injected.     Will focus on messaging.     Related Procedures  Follow Up In Dermatology - Established Patient    2. Neoplasm of uncertain behavior of skin    Related  Procedures  Follow Up In Dermatology - Established Patient  Follow Up In Dermatology - Established Patient    3. Angioma of skin  Scattered cherry-red papule(s).    A cherry hemangioma is a small macule (small, flat, smooth area) or papule (small, solid bump) formed from an overgrowth of tiny blood vessels in the skin. Cherry hemangiomas are characteristically red or purplish in color. They often first appear in middle adulthood and usually increase in number with age. Cherry hemangiomas are noncancerous (benign) and are common in adults.    The present appearance of the lesion is not worrisome but it should continue to be observed and testing/treatment may be warranted if change occurs.    Related Procedures  Follow Up In Dermatology - Established Patient  Follow Up In Dermatology - Established Patient    4. Benign nevus  Scattered, uniform and benign-appearing, regular brown melanocytic papules and macules.    The present appearance of the lesion is not worrisome but it should continue to be observed and testing/treatment may be warranted if change occurs.    Related Procedures  Follow Up In Dermatology - Established Patient  Follow Up In Dermatology - Established Patient    5. Seborrheic keratosis  Stuck on verrucous, tan-brown papules and plaques.      Seborrheic keratoses are common noncancerous (benign) growths of unknown cause seen in adults due to a thickening of an area of the top skin layer. Seborrheic keratoses may appear as if they are stuck on to the skin. They have distinct borders, and they may appear as papules (small, solid bumps) or plaques (solid, raised patches that are bigger than a thumbnail). They may be the same color as your skin, or they may be pink, light brown, darker brown, or very dark brown, or sometimes may appear black.    There is no way to prevent new seborrheic keratoses from forming. Seborrheic keratoses can be removed, but removal is considered a cosmetic issue and is usually  not covered by insurance.    PLAN  No treatment is needed unless there is irritation from clothing, such as itching or bleeding.  2.   Some lotions containing alpha hydroxy acids, salicylic acid, or urea may make the areas feel smoother with regular use but will not eliminate them.    Related Procedures  Follow Up In Dermatology - Established Patient  Follow Up In Dermatology - Established Patient    6. Lentigo  Scattered tan macules in sun-exposed areas.    A solar lentigo (plural, solar lentigines), also known as a sun-induced freckle or senile lentigo, is a dark (hyperpigmented) lesion caused by natural or artificial ultraviolet (UV) light. Solar lentigines may be single or multiple. This type of lentigo is different from a simple lentigo (lentigo simplex) because it is caused by exposure to UV light. Solar lentigines are benign, but they do indicate excessive sun exposure, a risk factor for the development of skin cancer.    To prevent solar lentigines, avoid exposure to sunlight in midday (10 AM to 3 PM), wear sun-protective clothing (tightly woven clothes and hats), and apply sunscreen (SPF 30 UVA and UVB block).    The present appearance of the lesion is not worrisome but it should continue to be observed and testing/treatment may be warranted if change occurs.    Related Procedures  Follow Up In Dermatology - Established Patient  Follow Up In Dermatology - Established Patient    7. Skin changes due to chronic exposure to nonionizing radiation  Actinic changes in the form of freckles, lentigines and hyper/hypopigmentation     ABCDEs of melanoma and atypical moles were discussed with the patient.    Patient was instructed to perform monthly self skin examination.  We recommended that the patient have regular full skin exams given an increased risk of subsequent skin cancers.    The patient was instructed to use sun protective behaviors including use of broad spectrum sunscreens and sun protective clothing to  reduce risk of skin cancers.    Warning signs of non-melanoma skin cancer discussed.    Related Procedures  Follow Up In Dermatology - Established Patient  Follow Up In Dermatology - Established Patient    8. History of nonmelanoma skin cancer    ABCDEs of melanoma and atypical moles were discussed with the patient.    Patient was instructed to perform monthly self skin examination.  We recommended that the patient have regular full skin exams given an increased risk of subsequent skin cancers.    The patient was instructed to use sun protective behaviors including use of broad spectrum sunscreens and sun protective clothing to reduce risk of skin cancers.    Warning signs of non-melanoma skin cancer discussed.    Related Procedures  Follow Up In Dermatology - Established Patient  Follow Up In Dermatology - Established Patient    9. Fissure in skin  Left Soft Triangle  Small fissure noted with some erythema    Not clearly infected at this time. Patient does admit to picking at the area. Advised to avoid picking. She has mupirocin at home already. Advised to apply 3 times a day for next 10-14 days. Also can apply vaseline 1-2 other times during the day to support healing. Call if fails to heal.           Return in 6 months for routine skin check or return to clinic sooner if needed

## 2024-07-10 ENCOUNTER — LAB (OUTPATIENT)
Dept: LAB | Facility: LAB | Age: 64
End: 2024-07-10
Payer: COMMERCIAL

## 2024-07-10 DIAGNOSIS — Z00.00 PREVENTATIVE HEALTH CARE: ICD-10-CM

## 2024-07-10 DIAGNOSIS — R79.89 LOW VITAMIN D LEVEL: ICD-10-CM

## 2024-07-10 LAB
25(OH)D3 SERPL-MCNC: 36 NG/ML (ref 30–100)
ALBUMIN SERPL BCP-MCNC: 4.1 G/DL (ref 3.4–5)
ALP SERPL-CCNC: 36 U/L (ref 33–136)
ALT SERPL W P-5'-P-CCNC: 18 U/L (ref 7–45)
ANION GAP SERPL CALC-SCNC: 12 MMOL/L (ref 10–20)
AST SERPL W P-5'-P-CCNC: 18 U/L (ref 9–39)
BASOPHILS # BLD AUTO: 0.02 X10*3/UL (ref 0–0.1)
BASOPHILS NFR BLD AUTO: 0.5 %
BILIRUB SERPL-MCNC: 0.5 MG/DL (ref 0–1.2)
BUN SERPL-MCNC: 23 MG/DL (ref 6–23)
CALCIUM SERPL-MCNC: 8.8 MG/DL (ref 8.6–10.3)
CHLORIDE SERPL-SCNC: 105 MMOL/L (ref 98–107)
CHOLEST SERPL-MCNC: 203 MG/DL (ref 0–199)
CHOLESTEROL/HDL RATIO: 2.1
CO2 SERPL-SCNC: 28 MMOL/L (ref 21–32)
CREAT SERPL-MCNC: 0.84 MG/DL (ref 0.5–1.05)
EGFRCR SERPLBLD CKD-EPI 2021: 78 ML/MIN/1.73M*2
EOSINOPHIL # BLD AUTO: 0.09 X10*3/UL (ref 0–0.7)
EOSINOPHIL NFR BLD AUTO: 2.2 %
ERYTHROCYTE [DISTWIDTH] IN BLOOD BY AUTOMATED COUNT: 18 % (ref 11.5–14.5)
GLUCOSE SERPL-MCNC: 94 MG/DL (ref 74–99)
HCT VFR BLD AUTO: 35 % (ref 36–46)
HDLC SERPL-MCNC: 94.8 MG/DL
HGB BLD-MCNC: 11.2 G/DL (ref 12–16)
IMM GRANULOCYTES # BLD AUTO: 0.02 X10*3/UL (ref 0–0.7)
IMM GRANULOCYTES NFR BLD AUTO: 0.5 % (ref 0–0.9)
LDLC SERPL CALC-MCNC: 94 MG/DL
LYMPHOCYTES # BLD AUTO: 1.69 X10*3/UL (ref 1.2–4.8)
LYMPHOCYTES NFR BLD AUTO: 40.5 %
MCH RBC QN AUTO: 32.7 PG (ref 26–34)
MCHC RBC AUTO-ENTMCNC: 32 G/DL (ref 32–36)
MCV RBC AUTO: 102 FL (ref 80–100)
MONOCYTES # BLD AUTO: 0.76 X10*3/UL (ref 0.1–1)
MONOCYTES NFR BLD AUTO: 18.2 %
NEUTROPHILS # BLD AUTO: 1.59 X10*3/UL (ref 1.2–7.7)
NEUTROPHILS NFR BLD AUTO: 38.1 %
NON HDL CHOLESTEROL: 108 MG/DL (ref 0–149)
NRBC BLD-RTO: 0 /100 WBCS (ref 0–0)
PLATELET # BLD AUTO: 282 X10*3/UL (ref 150–450)
POTASSIUM SERPL-SCNC: 4.5 MMOL/L (ref 3.5–5.3)
PROT SERPL-MCNC: 6.2 G/DL (ref 6.4–8.2)
RBC # BLD AUTO: 3.43 X10*6/UL (ref 4–5.2)
SODIUM SERPL-SCNC: 140 MMOL/L (ref 136–145)
TRIGL SERPL-MCNC: 69 MG/DL (ref 0–149)
TSH SERPL-ACNC: 1.03 MIU/L (ref 0.44–3.98)
VLDL: 14 MG/DL (ref 0–40)
WBC # BLD AUTO: 4.2 X10*3/UL (ref 4.4–11.3)

## 2024-07-10 PROCEDURE — 82607 VITAMIN B-12: CPT

## 2024-07-10 PROCEDURE — 84443 ASSAY THYROID STIM HORMONE: CPT

## 2024-07-10 PROCEDURE — 85025 COMPLETE CBC W/AUTO DIFF WBC: CPT

## 2024-07-10 PROCEDURE — 36415 COLL VENOUS BLD VENIPUNCTURE: CPT

## 2024-07-10 PROCEDURE — 82306 VITAMIN D 25 HYDROXY: CPT

## 2024-07-10 PROCEDURE — 83540 ASSAY OF IRON: CPT

## 2024-07-10 PROCEDURE — 80061 LIPID PANEL: CPT

## 2024-07-10 PROCEDURE — 80053 COMPREHEN METABOLIC PANEL: CPT

## 2024-07-10 PROCEDURE — 83550 IRON BINDING TEST: CPT

## 2024-07-15 DIAGNOSIS — D64.9 ANEMIA, UNSPECIFIED TYPE: Primary | ICD-10-CM

## 2024-07-15 LAB
IRON SATN MFR SERPL: 20 % (ref 25–45)
IRON SERPL-MCNC: 65 UG/DL (ref 35–150)
TIBC SERPL-MCNC: 322 UG/DL (ref 240–445)
UIBC SERPL-MCNC: 257 UG/DL (ref 110–370)
VIT B12 SERPL-MCNC: 627 PG/ML (ref 211–911)

## 2024-07-18 ENCOUNTER — LAB (OUTPATIENT)
Dept: LAB | Facility: LAB | Age: 64
End: 2024-07-18
Payer: COMMERCIAL

## 2024-07-18 DIAGNOSIS — D53.9 MACROCYTIC ANEMIA: ICD-10-CM

## 2024-07-18 DIAGNOSIS — D72.829 LEUKOCYTOSIS, UNSPECIFIED TYPE: Primary | ICD-10-CM

## 2024-07-18 DIAGNOSIS — D72.829 LEUKOCYTOSIS, UNSPECIFIED TYPE: ICD-10-CM

## 2024-07-18 LAB — ERYTHROCYTE [SEDIMENTATION RATE] IN BLOOD BY WESTERGREN METHOD: <1 MM/H (ref 0–30)

## 2024-07-18 PROCEDURE — 82746 ASSAY OF FOLIC ACID SERUM: CPT

## 2024-07-18 PROCEDURE — 85652 RBC SED RATE AUTOMATED: CPT

## 2024-07-18 PROCEDURE — 86803 HEPATITIS C AB TEST: CPT

## 2024-07-18 PROCEDURE — 86038 ANTINUCLEAR ANTIBODIES: CPT

## 2024-07-18 PROCEDURE — 87389 HIV-1 AG W/HIV-1&-2 AB AG IA: CPT

## 2024-07-18 PROCEDURE — 36415 COLL VENOUS BLD VENIPUNCTURE: CPT

## 2024-07-19 LAB
FOLATE SERPL-MCNC: 17.3 NG/ML
HCV AB SER QL: NONREACTIVE
HIV 1+2 AB+HIV1 P24 AG SERPL QL IA: NONREACTIVE

## 2024-07-22 ENCOUNTER — HOSPITAL ENCOUNTER (OUTPATIENT)
Dept: RADIOLOGY | Facility: HOSPITAL | Age: 64
Discharge: HOME | End: 2024-07-22
Payer: COMMERCIAL

## 2024-07-22 VITALS — WEIGHT: 127 LBS | HEIGHT: 65 IN | BODY MASS INDEX: 21.16 KG/M2

## 2024-07-22 DIAGNOSIS — Z12.31 ENCOUNTER FOR SCREENING MAMMOGRAM FOR MALIGNANT NEOPLASM OF BREAST: ICD-10-CM

## 2024-07-22 LAB — ANA SER QL HEP2 SUBST: NEGATIVE

## 2024-07-22 PROCEDURE — 77067 SCR MAMMO BI INCL CAD: CPT

## 2024-07-22 PROCEDURE — 77067 SCR MAMMO BI INCL CAD: CPT | Performed by: RADIOLOGY

## 2024-07-22 PROCEDURE — 77063 BREAST TOMOSYNTHESIS BI: CPT | Performed by: RADIOLOGY

## 2024-07-23 LAB
CELL COUNT (BLOOD): 4.02 X10*3/UL
CELL POPULATIONS: NORMAL
DIAGNOSIS: NORMAL
FLOW DIFFERENTIAL: NORMAL
FLOW TEST ORDERED: NORMAL
LAB TEST METHOD: NORMAL
NUMBER OF CELLS COLLECTED: NORMAL PER TUBE
PATH REPORT.TOTAL CANCER: NORMAL
SIGNATURE COMMENT: NORMAL
SPECIMEN VIABILITY: NORMAL

## 2024-08-27 ENCOUNTER — APPOINTMENT (OUTPATIENT)
Dept: DERMATOLOGY | Facility: CLINIC | Age: 64
End: 2024-08-27
Payer: COMMERCIAL

## 2024-09-30 ENCOUNTER — OFFICE VISIT (OUTPATIENT)
Dept: ORTHOPEDIC SURGERY | Facility: HOSPITAL | Age: 64
End: 2024-09-30
Payer: COMMERCIAL

## 2024-09-30 ENCOUNTER — HOSPITAL ENCOUNTER (OUTPATIENT)
Dept: RADIOLOGY | Facility: HOSPITAL | Age: 64
Discharge: HOME | End: 2024-09-30
Payer: COMMERCIAL

## 2024-09-30 VITALS — BODY MASS INDEX: 21.66 KG/M2 | WEIGHT: 130 LBS | HEIGHT: 65 IN

## 2024-09-30 DIAGNOSIS — M25.572 LEFT ANKLE PAIN, UNSPECIFIED CHRONICITY: ICD-10-CM

## 2024-09-30 DIAGNOSIS — S93.409A GRADE 2 ANKLE SPRAIN: Primary | ICD-10-CM

## 2024-09-30 PROCEDURE — 73610 X-RAY EXAM OF ANKLE: CPT | Mod: LEFT SIDE | Performed by: RADIOLOGY

## 2024-09-30 PROCEDURE — L1902 AFO ANKLE GAUNTLET PRE OTS: HCPCS | Performed by: PHYSICIAN ASSISTANT

## 2024-09-30 PROCEDURE — 99214 OFFICE O/P EST MOD 30 MIN: CPT | Performed by: PHYSICIAN ASSISTANT

## 2024-09-30 PROCEDURE — 3008F BODY MASS INDEX DOCD: CPT | Performed by: PHYSICIAN ASSISTANT

## 2024-09-30 PROCEDURE — 73610 X-RAY EXAM OF ANKLE: CPT | Mod: LT

## 2024-09-30 ASSESSMENT — PAIN DESCRIPTION - DESCRIPTORS: DESCRIPTORS: SHARP;ACHING

## 2024-09-30 ASSESSMENT — PAIN - FUNCTIONAL ASSESSMENT: PAIN_FUNCTIONAL_ASSESSMENT: 0-10

## 2024-09-30 ASSESSMENT — PAIN SCALES - GENERAL: PAINLEVEL_OUTOF10: 2

## 2024-09-30 NOTE — PATIENT INSTRUCTIONS
Wear your ankle brace with a good athletic shoe    You can use a bucket of room temperature water with Epson salt and do your ankle/toe exercises    1. Follow stretching exercises that were on a separate handout   2. Hold each stretch for a least 1 minute  3. Do not bounce while stretching  4. Stretch for 10 minutes at a time, 3x a day for 6 weeks then daily  5. Remember, it takes several weeks to a few months of consistent stretching to increase flexibility and decrease symptoms.     You can use OTC Voltaren gel or aspercream and apply it to the injured area.    Ice and elevate supported at the calf with no pressure on the heel to reduce swelling.    The patient was given a prescription for physical therapy.  Physical therapy is medically necessary to improve strength, balance, range of motion and functional outcomes after injury and/or surgery.    Follow up in 4-6 weeks or as needed

## 2024-10-04 NOTE — PROGRESS NOTES
NPV-   History of Present Illness  63 y.o.female here for left ankle pain  1. Grade 2 ankle sprain  Ankle Brace, Lace Up or A60    Referral to Physical Therapy    Ankle Brace, Lace Up or A60      2. Left ankle pain, unspecified chronicity  XR ankle left 3+ views        Mechanism of injury: ankle inversion stepping off curb while on vacation  Date of Injury/Pain: ~1.5 weeks ago  Location of pain: lateral ankle  Frequency of Pain: worse with walking   Associated symptoms? Swelling.  Previous treatment?  Ice, ankle wrap  Ankle improving    Past Medical History:   Diagnosis Date    Abnormal cytological findings in specimens from other organs, systems and tissues 08/07/2014    LGSIL (low grade squamous intraepithelial lesion) on Pap smear    Chondromalacia patellae, unspecified knee 02/17/2016    Chondromalacia of patella    Chronic sinusitis, unspecified 03/14/2016    Sinobronchitis    Disorder of the skin and subcutaneous tissue, unspecified 08/03/2016    Skin lesion    Effusion, unspecified knee 11/01/2016    Effusion of knee    Encounter for gynecological examination (general) (routine) without abnormal findings 02/17/2016    Visit for gynecologic examination    Encounter for gynecological examination (general) (routine) without abnormal findings 09/07/2017    Visit for gynecologic examination    Encounter for other screening for malignant neoplasm of breast 09/07/2017    Screening breast examination    Encounter for screening mammogram for malignant neoplasm of breast 02/17/2016    Visit for screening mammogram    Localized edema 10/18/2018    Periorbital edema    Localized swelling, mass and lump, right lower limb 09/08/2016    Mass of right knee    Menopausal and female climacteric states 02/17/2016    Menopausal symptoms    Menopausal and female climacteric states 09/07/2017    Menopausal symptoms    Other conditions influencing health status 09/26/2016    Mammogram normal    Other meniscus derangements,  unspecified lateral meniscus, right knee 2016    Derangement of lateral meniscus of right knee    Other meniscus derangements, unspecified medial meniscus, unspecified knee 2018    Derangement of medial meniscus    Pain in unspecified knee 2017    Joint pain, knee    Personal history of other medical treatment 2017    History of screening mammography        No Known Allergies     Past Surgical History:   Procedure Laterality Date    BREAST BIOPSY Left     HYSTEROSCOPY  2014    Hysteroscopy With Endometrial Ablation    MR ANGIO SPINE W IV CONTRAST  2020    MR SPINE ANGIO W IV CONTRAST 2020 [facility]    OOPHORECTOMY      OTHER SURGICAL HISTORY  2022    Salpingo-oophorectomy bilateral    OTHER SURGICAL HISTORY  2021    Endometrial ablation    TONSILLECTOMY  2014    Tonsillectomy    TUBAL LIGATION  2014    Tubal Ligation        Family History   Problem Relation Name Age of Onset    Breast cancer Maternal Cousin      Breast cancer Paternal Cousin          Social History     Socioeconomic History    Marital status:      Spouse name: Not on file    Number of children: Not on file    Years of education: Not on file    Highest education level: Not on file   Occupational History    Not on file   Tobacco Use    Smoking status: Former     Current packs/day: 0.00     Types: Cigarettes     Quit date:      Years since quittin.7    Smokeless tobacco: Never   Vaping Use    Vaping status: Never Used   Substance and Sexual Activity    Alcohol use: Not Currently     Alcohol/week: 3.0 standard drinks of alcohol     Types: 3 Standard drinks or equivalent per week    Drug use: Never    Sexual activity: Not on file   Other Topics Concern    Not on file   Social History Narrative    Not on file     Social Determinants of Health     Financial Resource Strain: Not on file   Food Insecurity: Not on file   Transportation Needs: Not on file    Physical Activity: Not on file   Stress: Not on file   Social Connections: Not on file   Intimate Partner Violence: Not on file   Housing Stability: Not on file        CURRENT MEDICATIONS:   Current Outpatient Medications   Medication Sig Dispense Refill    amoxicillin (Amoxil) 500 mg capsule TAKE 4 CAPSULES BY MOUTH ONE HOUR PRIOR TO DENTAL APPOINTMENT.      calcium carbonate-vitamin D3 500 mg-5 mcg (200 unit) powder in packet Take 1 tablet by mouth once daily.      estradiol (Estrace) 0.1 MG/GM vaginal cream Insert into the vagina. 3 times a week      MULTIVITAMIN ORAL Take 1 tablet by mouth once daily.      oxybutynin XL (Ditropan-XL) 10 mg 24 hr tablet TAKE 1 TABLET BY MOUTH EVERY DAY 30 tablet 11    triamcinolone (Kenalog) 0.5 % cream Apply topically once daily as needed for rash. Apply to affected areas 2-3 times daily 15 g 1     No current facility-administered medications for this visit.        27 point review of systems negative except what is stated in HPI    Constitutional Exam: patient's height and weight reviewed, well-kempt  Psychiatric Exam: alert and oriented x 3, appropriate mood and behavior  Eye Exam: ARIESTELLE  Pulmonary Exam: breathing non-labored, no apparent distress  Lymphatic exam: no appreciable lymphadenopathy in the lower extremities  Cardiovascular exam: DP pulses 2+ bilaterally, PT 2+ bilaterally, toes are pink with good capillary refill, no pitting edema  Skin exam: no open lesions, rashes, abrasions or ulcerations  Neurological exam: sensation to light touch intact in both lower extremities in peripheral and dermatomal distributions (except for any abnormalities noted in musculoskeletal exam)      On examination of the left ankle/foot:  Normal gait  Normal alignment  Minimal swelling of the ankle. No ecchymosis or erythema  Normal ROM in plantarflexion, dorsiflexion, inversion and eversion  Normal strength in plantarflexion, dorsiflexion, inversion and eversion.  Tenderness to  palpation: minimal over ATFL   No tenderness to palpation over the Achilles, lateral and medial malleolus, posterior tibial tendon, peroneal tendon, base of fifth metatarsal, deltoid ligament, talus or navicular.  Neurovascularly intact.  No sensory deficit to light touch.  Dorsalis pedis and posterior tibial pulses 2+ bilaterally.  Negative proprioception testing.   - Fenton's test                              - Dorsiflexion-eversion test  Trace Anterior Drawer test                        - Talar tilt test  - Squeeze test     IMAGING  I personally reviewed the patient's x-ray images and reports of the left ankle. The xrays show no fractures or dislocation.  Minimal midfoot degenerative changes         ASSESSMENT: right ankle sprain grade II    PLAN: Treatment options were discussed with the patient. Patient was placed in an ankle lace up brace to be WBAT.  They were given boot instructions. The patient was given a prescription for physical therapy.  Physical therapy is medically necessary to improve strength, balance, range of motion and functional outcomes after injury and/or surgery. Patient was given a handout and instructed on an at home stretching program.  They should do these exercises 3 times per day for 6 weeks and then daily. Patient can use OTC aspercream for pain and continue to ice and elevate supported at the calf to reduce swelling. All the patient's questions were answered. The patient agrees with the above plan.  Follow up in 4-6 weeks or as needed    Patient was prescribed an ankle lace up brace for ankle sprain.The patient is ambulatory with or without aid; but, has weakness, instability and/or deformity of their left ankle/foot which requires stabilization from this orthosis to improve their function.      Verbal and written instructions for the use, wear schedule, cleaning and application of this item were given.  Patient was instructed that should the brace result in increased pain,  decreased sensation, increased swelling, or an overall worsening of their medical condition, to please contact our office immediately.     Orthotic management and training was provided for skin care, modifications due to healing tissues, edema changes, interruption in skin integrity, and safety precautions with the orthosis.     Oliver Bolden PA-C

## 2024-11-21 DIAGNOSIS — N39.41 URGE INCONTINENCE: ICD-10-CM

## 2024-11-21 RX ORDER — OXYBUTYNIN CHLORIDE 10 MG/1
10 TABLET, EXTENDED RELEASE ORAL DAILY
Qty: 30 TABLET | Refills: 11 | Status: SHIPPED | OUTPATIENT
Start: 2024-11-21

## 2024-12-09 ENCOUNTER — APPOINTMENT (OUTPATIENT)
Dept: DERMATOLOGY | Facility: CLINIC | Age: 64
End: 2024-12-09
Payer: COMMERCIAL

## 2024-12-09 DIAGNOSIS — Z85.828 HISTORY OF NONMELANOMA SKIN CANCER: ICD-10-CM

## 2024-12-09 DIAGNOSIS — L57.9 SKIN CHANGES DUE TO CHRONIC EXPOSURE TO NONIONIZING RADIATION: ICD-10-CM

## 2024-12-09 DIAGNOSIS — D18.01 ANGIOMA OF SKIN: ICD-10-CM

## 2024-12-09 DIAGNOSIS — L91.0 HYPERTROPHIC SCAR: ICD-10-CM

## 2024-12-09 DIAGNOSIS — L82.1 SEBORRHEIC KERATOSIS: ICD-10-CM

## 2024-12-09 DIAGNOSIS — D22.9 BENIGN NEVUS: ICD-10-CM

## 2024-12-09 DIAGNOSIS — L81.4 LENTIGO: ICD-10-CM

## 2024-12-09 PROCEDURE — 99213 OFFICE O/P EST LOW 20 MIN: CPT | Performed by: NURSE PRACTITIONER

## 2024-12-09 PROCEDURE — 1036F TOBACCO NON-USER: CPT | Performed by: NURSE PRACTITIONER

## 2024-12-09 NOTE — PROGRESS NOTES
Subjective     Carlotta Mitchell is a 63 y.o. female who presents for the following: Skin Check.     Review of Systems:  No other skin or systemic complaints other than what is documented elsewhere in the note.    The following portions of the chart were reviewed this encounter and updated as appropriate:   Tobacco  Allergies  Meds  Problems  Med Hx  Surg Hx         Skin Cancer History  Biopsy Date Type Location Status   11/27/23 [facility] Right Breast Refer for Excision  12/26/23       Specialty Problems          Dermatology Problems    Eczema        Objective   Well appearing patient in no apparent distress; mood and affect are within normal limits.    A full examination was performed including scalp, head, eyes, ears, nose, lips, neck, chest, axillae, abdomen, back, buttocks, bilateral upper extremities, bilateral lower extremities, hands, feet, fingers, toes, fingernails, and toenails. All findings within normal limits unless otherwise noted below.      Assessment/Plan   1. Angioma of skin  Scattered cherry-red papule(s).    A cherry hemangioma is a small macule (small, flat, smooth area) or papule (small, solid bump) formed from an overgrowth of tiny blood vessels in the skin. Cherry hemangiomas are characteristically red or purplish in color. They often first appear in middle adulthood and usually increase in number with age. Cherry hemangiomas are noncancerous (benign) and are common in adults.    The present appearance of the lesion is not worrisome but it should continue to be observed and testing/treatment may be warranted if change occurs.    Related Procedures  Follow Up In Dermatology - Established Patient  Follow Up In Dermatology - Established Patient    2. Benign nevus  Scattered, uniform and benign-appearing, regular brown melanocytic papules and macules.    The present appearance of the lesion is not worrisome but it should continue to be observed and testing/treatment may be warranted if change  occurs.    Related Procedures  Follow Up In Dermatology - Established Patient  Follow Up In Dermatology - Established Patient    3. Seborrheic keratosis  Stuck on verrucous, tan-brown papules and plaques.      Seborrheic keratoses are common noncancerous (benign) growths of unknown cause seen in adults due to a thickening of an area of the top skin layer. Seborrheic keratoses may appear as if they are stuck on to the skin. They have distinct borders, and they may appear as papules (small, solid bumps) or plaques (solid, raised patches that are bigger than a thumbnail). They may be the same color as your skin, or they may be pink, light brown, darker brown, or very dark brown, or sometimes may appear black.    There is no way to prevent new seborrheic keratoses from forming. Seborrheic keratoses can be removed, but removal is considered a cosmetic issue and is usually not covered by insurance.    PLAN  No treatment is needed unless there is irritation from clothing, such as itching or bleeding.  2.   Some lotions containing alpha hydroxy acids, salicylic acid, or urea may make the areas feel smoother with regular use but will not eliminate them.    Related Procedures  Follow Up In Dermatology - Established Patient  Follow Up In Dermatology - Established Patient    4. Lentigo  Scattered tan macules in sun-exposed areas.    A solar lentigo (plural, solar lentigines), also known as a sun-induced freckle or senile lentigo, is a dark (hyperpigmented) lesion caused by natural or artificial ultraviolet (UV) light. Solar lentigines may be single or multiple. This type of lentigo is different from a simple lentigo (lentigo simplex) because it is caused by exposure to UV light. Solar lentigines are benign, but they do indicate excessive sun exposure, a risk factor for the development of skin cancer.    To prevent solar lentigines, avoid exposure to sunlight in midday (10 AM to 3 PM), wear sun-protective clothing (tightly woven  clothes and hats), and apply sunscreen (SPF 30 UVA and UVB block).    The present appearance of the lesion is not worrisome but it should continue to be observed and testing/treatment may be warranted if change occurs.    Related Procedures  Follow Up In Dermatology - Established Patient  Follow Up In Dermatology - Established Patient    5. Skin changes due to chronic exposure to nonionizing radiation  Actinic changes in the form of freckles, lentigines and hyper/hypopigmentation     ABCDEs of melanoma and atypical moles were discussed with the patient.    Patient was instructed to perform monthly self skin examination.  We recommended that the patient have regular full skin exams given an increased risk of subsequent skin cancers.    The patient was instructed to use sun protective behaviors including use of broad spectrum sunscreens and sun protective clothing to reduce risk of skin cancers.    Warning signs of non-melanoma skin cancer discussed.    Related Procedures  Follow Up In Dermatology - [facility] Patient  Follow Up In Dermatology - [facility] Patient    6. History of nonmelanoma skin cancer    ABCDEs of melanoma and atypical moles were discussed with the patient.    Patient was instructed to perform monthly self skin examination.  We recommended that the patient have regular full skin exams given an increased risk of subsequent skin cancers.    The patient was instructed to use sun protective behaviors including use of broad spectrum sunscreens and sun protective clothing to reduce risk of skin cancers.    Warning signs of non-melanoma skin cancer discussed.    Related Procedures  Follow Up In Dermatology - Established Patient  Follow Up In Dermatology - Established Patient    7. Hypertrophic scar  Right Breast  Thickened hypertrophic fleshed tone to pink scar, no reoccurrence.     Softer compared to last exam. Continue with vitamin E oil and messaging.     Related Procedures  Follow Up In Dermatology  - Established Patient  Follow Up In Dermatology - Established Patient          Return in 6 months for routine skin check or return to clinic sooner if needed

## 2024-12-09 NOTE — PATIENT INSTRUCTIONS

## 2024-12-16 ENCOUNTER — TELEPHONE (OUTPATIENT)
Dept: UROLOGY | Facility: CLINIC | Age: 64
End: 2024-12-16

## 2024-12-16 NOTE — TELEPHONE ENCOUNTER
Pt is requesting a refill for estradiol (Estrace) 0.1 MG/GM vaginal cream   Drug [facility] [address] [facility]

## 2024-12-23 ENCOUNTER — APPOINTMENT (OUTPATIENT)
Dept: UROLOGY | Facility: CLINIC | Age: 64
End: 2024-12-23
Payer: COMMERCIAL

## 2024-12-23 DIAGNOSIS — N95.2 VAGINAL ATROPHY: ICD-10-CM

## 2024-12-23 DIAGNOSIS — N81.89 PELVIC FLOOR WEAKNESS: ICD-10-CM

## 2024-12-23 DIAGNOSIS — N39.41 URGE INCONTINENCE: Primary | ICD-10-CM

## 2024-12-23 DIAGNOSIS — R10.2 PELVIC PAIN IN FEMALE: ICD-10-CM

## 2024-12-23 PROCEDURE — 1159F MED LIST DOCD IN RCRD: CPT | Performed by: OBSTETRICS & GYNECOLOGY

## 2024-12-23 PROCEDURE — 1160F RVW MEDS BY RX/DR IN RCRD: CPT | Performed by: OBSTETRICS & GYNECOLOGY

## 2024-12-23 PROCEDURE — 99441 PR PHYS/QHP TELEPHONE EVALUATION 5-10 MIN: CPT | Performed by: OBSTETRICS & GYNECOLOGY

## 2024-12-23 PROCEDURE — 1036F TOBACCO NON-USER: CPT | Performed by: OBSTETRICS & GYNECOLOGY

## 2024-12-23 PROCEDURE — 1123F ACP DISCUSS/DSCN MKR DOCD: CPT | Performed by: OBSTETRICS & GYNECOLOGY

## 2024-12-23 RX ORDER — OXYBUTYNIN CHLORIDE 10 MG/1
10 TABLET, EXTENDED RELEASE ORAL DAILY
Qty: 90 TABLET | Refills: 3 | Status: SHIPPED | OUTPATIENT
Start: 2024-12-23 | End: 2025-12-23

## 2024-12-23 RX ORDER — ESTRADIOL 0.1 MG/G
CREAM VAGINAL
Qty: 42.5 G | Refills: 2 | Status: SHIPPED | OUTPATIENT
Start: 2024-12-23

## 2024-12-23 NOTE — PROGRESS NOTES
Subjective   Patient ID: Carlotta Mitchell is a 64 y.o. female who presents for No chief complaint on file..  Virtual or Telephone Consent    An interactive audio and video telecommunication system which permits real time communications between the patient (at the originating site) and provider (at the distant site) was utilized to provide this telehealth service.   Verbal consent was requested and obtained from Carlotta Mitchell on this date, 12/23/24 for a telehealth visit.   8 minutes were spent discussing the patients concerns and coordinating care    HPI  This visit was performed through telemedicine   64 y.o. with urge urinary incontinence, pelvic floor weakness and pain, and vaginal atrophy.    She explains that her symptoms have been well controlled. She continues to utilize her oxybutynin    She denies any vaginal complaints, no abnormal bleeding or discharge.  She is continuing her vaginal estrogen therapy 3 times a week and is very satisfied with this therapy.  She requires a refill on this now.    She denies any bowel related complaints, no fecal or flatal incontinence.     She has no other complaints.     From Previous note   60-year-old with urge urinary incontinence, vaginal atrophy, and pelvic floor weakness and pain.     The patient is overall very satisfied with her 10 mg of oxybutynin. She denies any dry mouth or constipation complaints. She has noted significant and near complete resolution of her symptoms. She denies any UTI-like symptoms. She is also quite satisfied with her vaginal estrogen therapy. She has not followed up with pelvic floor physical therapy.     She does require a refill on her medications.     She has no new complaints.     From previous note  60-year-old presenting as a referral from Dr. Whalen with complaints of urinary urgency and frequency and urge related incontinence.     The patient has noted a roughly 1 to 2-year history of worsening urinary urgency, frequency,  and urge related incontinence. She notes near complete loss of control notes a freaking out of not moving it notes. She notes rare stress incontinence but this is less bothersome to her. She has 1-2 episodes of nocturia. She denies enuresis. She notes nearly daily urge related incontinence. She denies a history of nephrolithiasis, chronic urinary tract infections, or any gross hematuria.     She does have episodic constipation. She denies any fecal or flatal incontinence.     She is sexually active. She notes vaginal dryness and irritation. She denies any abnormal vaginal bleeding or discharge.     She has no other complaints.     Review of Systems  Constitutional: No fever, No chills and No fatigue.   Eyes: No vision problems and No dryness of the eyes.   ENT: No dry mouth, No hearing loss and No nosebleeds.   Cardiovascular: No chest pain, No palpitations and No orthopnea.   Respiratory: No shortness of breath, No cough and No wheezing.   Gastrointestinal: No abdominal pain, No constipation, No nausea, No diarrhea, No vomiting and No melena.   Genitourinary: As noted in HPI.   Musculoskeletal: No back pain, No myalgias, No muscle weakness, No joint swelling and No leg edema.   Integumentary: No rashes, No skin lesion and No itching.   Neurological: No headache, No numbness and No dizziness.   Psychiatric: No sleep disturbances, No anxiety and No depression.   Endocrine: No hot flashes, No loss of hair and No hirsutism.   Hematologic/Lymphatic: No swollen glands, No tendency for easy bleeding and No tendency for easy bruising.   All other systems have been reviewed and are negative for complaint.        Objective   Physical Exam  PHYSICAL EXAMINATION:  The following visit was performed to telemedicine.        Assessment/Plan   60-year-old with urge urinary incontinence, pelvic floor weakness and pain, and vaginal atrophy.     1. Patient appears to be having excellent results with her 10 mg of oxybutynin. We again  discussed the AUA OAB care pathway. We discussed fluid management strategies and timed voiding. We have previously discussed medication therapy and the possibility of increasing this dose or proceeding with dual therapy. We have previously discussed third line options including PTNS, Botox, and InterStim. She is very comfortable with her present therapy moving forward and a new prescription was called into her pharmacy.     2. We discussed her pelvic floor weakness and pain as it relates to her lower urinary tract complaints. The patient was previously provided a referral as well as a list of providers for pelvic floor physical therapy. She is not interested in this at this time.     3. We again discussed the patient's vaginal atrophy. The patient does have a positive history of breast gene mutation. We discussed that vaginal estrogen therapy does not increase risk of breast cancer or have systemic risks associated with it. We have previously discussed at safety, efficacy, proper utilization. We will continue to utilize this 3 times weekly moving forward.  A new prescription was called into her pharmacy.     4. The patient will follow up in 1 year.     KRISTIN Higgins MD         Scribe Attestation  By signing my name below, I, Shruti Jordan attest that this documentation has been prepared under the direction and in the presence of Cortez Higgins MD. All medical record entries made by the Scribe were at my direction or personally dictated by me. I have reviewed the chart and agree that the record accurately reflects my personal performance of the history, physical exam, discussion and plan.

## 2025-03-27 ENCOUNTER — HOSPITAL ENCOUNTER (OUTPATIENT)
Dept: RADIOLOGY | Facility: HOSPITAL | Age: 65
Discharge: HOME | End: 2025-03-27
Payer: COMMERCIAL

## 2025-03-27 ENCOUNTER — APPOINTMENT (OUTPATIENT)
Dept: PRIMARY CARE | Facility: CLINIC | Age: 65
End: 2025-03-27
Payer: COMMERCIAL

## 2025-03-27 VITALS
DIASTOLIC BLOOD PRESSURE: 76 MMHG | WEIGHT: 127 LBS | OXYGEN SATURATION: 96 % | HEART RATE: 72 BPM | SYSTOLIC BLOOD PRESSURE: 124 MMHG | BODY MASS INDEX: 21.13 KG/M2

## 2025-03-27 DIAGNOSIS — M54.50 LUMBOSACRAL PAIN, CHRONIC: Primary | ICD-10-CM

## 2025-03-27 DIAGNOSIS — G89.29 LUMBOSACRAL PAIN, CHRONIC: ICD-10-CM

## 2025-03-27 DIAGNOSIS — M54.16 LUMBAR RADICULITIS: ICD-10-CM

## 2025-03-27 DIAGNOSIS — M54.50 LUMBOSACRAL PAIN, CHRONIC: ICD-10-CM

## 2025-03-27 DIAGNOSIS — Z00.00 PREVENTATIVE HEALTH CARE: ICD-10-CM

## 2025-03-27 DIAGNOSIS — R79.89 LOW VITAMIN D LEVEL: ICD-10-CM

## 2025-03-27 DIAGNOSIS — G89.29 LUMBOSACRAL PAIN, CHRONIC: Primary | ICD-10-CM

## 2025-03-27 PROCEDURE — 72110 X-RAY EXAM L-2 SPINE 4/>VWS: CPT

## 2025-03-27 PROCEDURE — 99214 OFFICE O/P EST MOD 30 MIN: CPT | Performed by: FAMILY MEDICINE

## 2025-03-27 PROCEDURE — 1159F MED LIST DOCD IN RCRD: CPT | Performed by: FAMILY MEDICINE

## 2025-03-27 PROCEDURE — 72220 X-RAY EXAM SACRUM TAILBONE: CPT

## 2025-03-27 PROCEDURE — 1123F ACP DISCUSS/DSCN MKR DOCD: CPT | Performed by: FAMILY MEDICINE

## 2025-03-27 PROCEDURE — 1036F TOBACCO NON-USER: CPT | Performed by: FAMILY MEDICINE

## 2025-03-27 ASSESSMENT — PATIENT HEALTH QUESTIONNAIRE - PHQ9
1. LITTLE INTEREST OR PLEASURE IN DOING THINGS: NOT AT ALL
2. FEELING DOWN, DEPRESSED OR HOPELESS: NOT AT ALL
SUM OF ALL RESPONSES TO PHQ9 QUESTIONS 1 AND 2: 0

## 2025-03-27 NOTE — PATIENT INSTRUCTIONS
Please get updated xrays of the back     Referral to PT , hopefully to try some things you can't do at home     Then I think we will likely need an MRI , so I ordered it to see if it will get approved earlier than later.     Repeat colonoscopy in 2026    Tetanus booster is due in 2026,     Mammo was good on 7/22/24, repeat yearly.     Get updated fasting blood work prior to next visit.      xrays on on 5/22/23 - No acute findings of the lumbar spine and hips. Mild degenerative changes of the lumbar spine. Keep up with the stretching.     Continue the potassium and magnesium     Please try the trazodone as needed for insomnia as needed.     Try to drink one Protein Water per day or increase egg whites/ tuna in water/ chicken breast     Follow up as scheduled in a few months, or sooner as needed.

## 2025-03-27 NOTE — PROGRESS NOTES
Subjective   Carlotta Mitchell is a 64 y.o. female who presents for Back Pain (Lower back pain into hips - patient also has tingling down legs x 2 years getting worse).    Fasting labs due in July 2025     HPI  Father with SLE   - pt had nornal KAVON, sed rate on 7/18/24      #) lumbar pain with bilateral radiculitis   - neg HLA, Rh, KAVON and KAVON in May 2023  - has done PT in the past and does a strict low back exercise routine every AM   - progressing over the last few years   - we did xrays of the hips and lumbar spine on 5/22/23   - DJD at L3/L4, normal hip xrays  - no gross weakness,  - some tingles in the both legs , pretty consistent   - still does daily walking , sore at first and then it helps   - taking 600 mg of ibuprofen almost every day   - is getting some toes cramping   - no change in bowel movement or urination ( is treated for incontinence of using, urge)   - there are times the pain is severe and sharp     #) hypertrophic scar - steroid injection last time   - had a basal cell skin cancer removed in December 4. 2023  - then dehiscence and was Rx mupirocin   - scar is thick and in a cosmetic area of the chest where a low cut shirt would show   - pt agreeable to try a small kenalog injection ( 10 mg) into the scar)   #) leg swelling - better  - had vascular studies and were good     #) Incontinence and pelvic pain - no change in urination   - on oxybutinin and it is helping   - ovaries for RAD51 gene- Evelyn Ruggiero   - follows with uro/gyn  - also saw Dr Higgins , in the past      #) dermatitis behind the left ear  - cortisone cream is keeping stable.      #) lipoma on left forearm  - no numbness tingling or weakness      #) OA on the low back and knee- stable  - follows with ortho and had PT   - trying to do home exercises / stretches   - Ibuprofen as needed, always with golf   - xrays on on 5/22/23 - No acute findings of the lumbar spine and hips. Mild degenerative changes of the lumbar spine.     #)  "Preventive:  Smoker: never, quit more than 15 yrs ago   Etoh: occ  BMI: 22   BP: good  118/74   Fam h/o:  - Mom- alzheimer   - Dad- UC   - Siblings  Vaccinations:  - Flu- declined   - Tetanus- 2016   - Shingles- had   - Pneumonia - n/a til 64   - COVID- yes   PAP: 2019- due 2022  Mammogram: Feb 2021   Colonoscopy: Due in 2026   DEXA: at age 64   CT Cardiac score:   ASA 81:   Low Dose CT: n/a   HepC screen: now   Fasting blood work: now   Last eye exam: needs, lasik   Last dental Exam:  Diet:  Exercise: walks daily   Mood: good   Sleep: good   Occupation: electro plating   Supplements:    ROS was completed and all systems are negative with the exception of what was noted in the the HPI.     Objective     /76   Pulse 72   Wt 57.6 kg (127 lb)   SpO2 96%   BMI 21.13 kg/m²      Last Labs:     CMP:   Lab Results   Component Value Date    CALCIUM 8.8 07/10/2024    CALCIUM 9.1 05/23/2023    CALCIUM 9.3 10/27/2021    PROT 6.2 (L) 07/10/2024    PROT 6.3 (L) 05/23/2023    PROT 6.5 10/27/2021    ALBUMIN 4.1 07/10/2024    ALBUMIN 4.3 05/23/2023    ALBUMIN 4.4 10/27/2021    AST 18 07/10/2024    AST 20 05/23/2023    AST 17 10/27/2021    ALKPHOS 36 07/10/2024    ALKPHOS 41 05/23/2023    ALKPHOS 43 10/27/2021    BILITOT 0.5 07/10/2024    BILITOT 0.7 05/23/2023    BILITOT 0.6 10/27/2021     CBC:   Lab Results   Component Value Date    WBC 4.2 (L) 07/10/2024    WBC 4.0 (L) 05/23/2023    WBC 3.8 (L) 10/27/2021    HGB 11.2 (L) 07/10/2024    HGB 12.0 05/23/2023    HGB 12.8 10/27/2021    HCT 35.0 (L) 07/10/2024    HCT 37.2 05/23/2023    HCT 40.9 10/27/2021     (H) 07/10/2024    MCV 97 05/23/2023     10/27/2021     07/10/2024     05/23/2023     10/27/2021     A1C:   No results found for: \"HGBA1C\"  LIPID PANEL:   Lab Results   Component Value Date    CHOL 203 (H) 07/10/2024    CHOL 196 05/23/2023    CHOL 217 (H) 10/27/2021    TRIG 69 07/10/2024    TRIG 82 05/23/2023    TRIG 62 10/27/2021    HDL " "94.8 07/10/2024    HDL 88.0 05/23/2023    HDL 88.5 10/27/2021    CHHDL 2.1 07/10/2024    CHHDL 2.2 05/23/2023    CHHDL 2.5 10/27/2021    LDLF 92 05/23/2023    LDLF 116 (H) 10/27/2021    VLDL 14 07/10/2024    VLDL 16 05/23/2023    VLDL 12 10/27/2021    NHDL 108 07/10/2024     TSH:   Lab Results   Component Value Date    TSH 1.03 07/10/2024    TSH 1.38 05/23/2023    TSH 1.32 10/27/2021     PSA:   No results found for: \"PSA\"    Physical Exam  GEN: A+O, no acute distress  HEENT: NC/AT, Oropharynx clear, no exudates, TM visualized, Extraoccular muscles intact, no facial droop; no thyromegaly or cervical LAD  RESP: CTAB, no wheezes   CV: RRR, no murmurs  ABD: soft, non-tender, + BS  SKIN: no rashes or bruising, no peripheral edema   NEURO: CN II-XII grossly intact, moves all extremities equally, no tremor   PSYCH: normal affect, appropriate mood     Assessment/Plan   Problem List Items Addressed This Visit    None    Please get updated xrays of the back     Referral to PT , hopefully to try some things you can't do at home     Then I think we will likely need an MRI , so I ordered it to see if it will get approved earlier than later.     Repeat colonoscopy in 2026    Tetanus booster is due in 2026,     Mammo was good on 7/22/24, repeat yearly.     Get updated fasting blood work prior to next visit.      xrays on on 5/22/23 - No acute findings of the lumbar spine and hips. Mild degenerative changes of the lumbar spine. Keep up with the stretching.     Continue the potassium and magnesium     Please try the trazodone as needed for insomnia as needed.     Try to drink one Protein Water per day or increase egg whites/ tuna in water/ chicken breast     Follow up as scheduled in a few months, or sooner as needed.        Adrienne Omalley DO, MSMed, ABOM  [address]   [address]   [place] [address]  Ph. [phone]  Fx. [phone]  "

## 2025-04-18 ENCOUNTER — EVALUATION (OUTPATIENT)
Dept: PHYSICAL THERAPY | Facility: CLINIC | Age: 65
End: 2025-04-18
Payer: COMMERCIAL

## 2025-04-18 DIAGNOSIS — G89.29 LUMBOSACRAL PAIN, CHRONIC: ICD-10-CM

## 2025-04-18 DIAGNOSIS — M54.50 LUMBOSACRAL PAIN, CHRONIC: ICD-10-CM

## 2025-04-18 DIAGNOSIS — M54.50 PAIN, LOW BACK: ICD-10-CM

## 2025-04-18 DIAGNOSIS — M54.16 LUMBAR RADICULITIS: Primary | ICD-10-CM

## 2025-04-18 PROCEDURE — 97110 THERAPEUTIC EXERCISES: CPT | Mod: GP

## 2025-04-18 PROCEDURE — 97162 PT EVAL MOD COMPLEX 30 MIN: CPT | Mod: GP

## 2025-04-18 ASSESSMENT — PAIN SCALES - GENERAL: PAINLEVEL_OUTOF10: 3

## 2025-04-18 ASSESSMENT — ENCOUNTER SYMPTOMS
PAIN SCALE AT HIGHEST: 8
OCCASIONAL FEELINGS OF UNSTEADINESS: 0
LOSS OF SENSATION IN FEET: 1
DEPRESSION: 0
PAIN SCALE: 3
PAIN SCALE AT LOWEST: 0

## 2025-04-18 ASSESSMENT — PAIN - FUNCTIONAL ASSESSMENT: PAIN_FUNCTIONAL_ASSESSMENT: 0-10

## 2025-04-18 NOTE — PROGRESS NOTES
Physical Therapy Evaluation and Treatment     Patient Name: Carlotta Mitchell  MRN: [id number]  Encounter date: 4/18/2025  Time Calculation  Start Time: 1030  Stop Time: 1120  Time Calculation (min): 50 min  PT Evaluation Time Entry  PT Evaluation (Moderate) Time Entry: 35  PT Therapeutic Procedures Time Entry  Therapeutic Exercise Time Entry: 15    Visit # 1 of 16  Visits/Dates Authorized: 2025: MMO - NO AUTH / $3500 DED not met, then 100% COVERAGE / 40V ot/pt - 0 used / Availity [id number] / ds 4/17/25 //   CPT 31979 Not covered. CPT 42957 req auth //        Current Problem:   Problem List Items Addressed This Visit           ICD-10-CM    Pain, low back M54.50    Relevant Orders    Follow Up In Physical Therapy    Lumbar radiculitis - Primary (Chronic) M54.16    Relevant Orders    Follow Up In Physical Therapy     Other Visit Diagnoses         Codes      Lumbosacral pain, chronic     M54.50, G89.29    Relevant Orders    Follow Up In Physical Therapy          Precautions:  Precautions  STEADI Fall Risk Score (The score of 4 or more indicates an increased risk of falling): 2       Steadi Fall Risk  One or more falls in the last year? No  How many Times?    Was the patient injured in the fall?    Has trouble stepping onto curb? No  Advised to use a cane or walker to get around safely? No  Often has to rush to toilet? Yes  Feels unsteady when walking? No  Has lost some feeling in feet? Yes  Often feels sad or depressed? No  Steadies self on furniture while walking at home? No  Takes medicine that makes them feel lightheaded or more tired than usual? No  Worried about Falling? No  Takes medicine to sleep or improve mood? No  Needs to push with hands when rising from a chair? No      Subjective Evaluation    History of Present Illness  Mechanism of injury: General:  Reason for Referral: Lumbosacral pain, chronic [M54.50, G89.29], Lumbar radiculitis [M54.16]   Referred By: Dr Adrienne Omalley  Past Medical History Relevant  "to Rehab: R TKA  Family/Caregiver Present: none present     Precautions:   STEADI Fall Risk Score 2  Medical Precautions: radicular symptoms, osteoporosis, hx of R TKA     Medical History Form: Reviewed (scanned into chart)    Review of Systems:  CONST: No fever, chills, fatigue, weight changes  EYES: No loss of vision  ENT: No hearing loss, tinnitus  CV: No chest pain, palpitations  RESP: No dyspnea, shortness of breath, cough  GI: nausea, vomiting  : Hx of urgency of urinary incontinence  MSK: No abnormal joint swelling but some arthritic  SKIN: No rash, no hives  NEURO: No headache, dizziness  PSYCH: No anxiety, depression  HEM/LYMPH: No easy bruising or bleeding  All other systems reviewed are negative     Allergies: none    Subjective:    Pt is a 64 y.o. female who presents for Back Pain (Lower back pain into hips - patient also has tingling down legs x 2 years getting worse).  Pt notices that symptoms are greatest in the morning. Does have intermittent radicular symptoms but \"only when my back is really bothering me.\" Pt enjoys golfing and walking but has been limited as of lately and has to take the morning to perform her exercises and \"icy hot roll on gel.\"  Home set-up no concerns  Functional limitations: golf, walking, ADLs in the morning  Assistive device: none  Caregiver / family support system: spouse  Aggravating symptoms: rapid change in movement. Picking up and lifting objects of > 10-20 pounds.  Relieving symptoms: icy hot, stretching, exercise program, changing position        Pain  Current pain rating: 3  At best pain ratin (with medication)  At worst pain ratin         Pain Assessment: 0-10  0-10 (Numeric) Pain Score: 3  Pain Location: Back (additional pain in the knees)    Objective     Active Range of Motion     Lumbar   Extension: with pain  Left lateral flexion: with pain  Right lateral flexion: with pain  Left Hip   External rotation (90/90): 30 degrees   Internal rotation (90/90): " 30 degrees     Right Hip   External rotation (90/90): 35 degrees   Internal rotation (90/90): 35 degrees     Additional Active Range of Motion Details  L lateral lumbar flexion was more painful then R    Increase in numbness and tingling with extension  Flexion felt good but with return to neutral pain noticeable  Flexion 50-75% of normative values  Extension 25% of normative values    Strength/Myotome Testing     Left Hip   Planes of Motion   Flexion: 4-  Extension: 4  Abduction: 4-    Isolated Muscles   Gluteus ede: 4-    Right Hip   Planes of Motion   Flexion: 4  Extension: 4  Abduction: 4-    Isolated Muscles   Gluteus maximums: 4-    Left Knee   Flexion: 4  Extension: 4    Right Knee   Flexion: 4  Extension: 4    Additional Strength Details  Reduced strength of glutes and hip stabilizers    Tests       Thoracic   Positive slump.     Additional Tests Details  Slump + on R and L with increase in numbness and tingling    General Comments     Spine Comments  R SIJ hypo mobile with + fwd bend and stork. RLE significantly longer than L with R ant rotation, R inflare, and R downslip          Objective     Active Range of Motion     Lumbar   Extension: with pain  Left lateral flexion: with pain  Right lateral flexion: with pain  Left Hip   External rotation (90/90): 30 degrees   Internal rotation (90/90): 30 degrees     Right Hip   External rotation (90/90): 35 degrees   Internal rotation (90/90): 35 degrees     Additional Active Range of Motion Details  L lateral lumbar flexion was more painful then R    Increase in numbness and tingling with extension  Flexion felt good but with return to neutral pain noticeable  Flexion 50-75% of normative values  Extension 25% of normative values    Strength/Myotome Testing     Left Hip   Planes of Motion   Flexion: 4-  Extension: 4  Abduction: 4-    Isolated Muscles   Gluteus ede: 4-    Right Hip   Planes of Motion   Flexion: 4  Extension: 4  Abduction: 4-    Isolated  Muscles   Gluteus maximums: 4-    Left Knee   Flexion: 4  Extension: 4    Right Knee   Flexion: 4  Extension: 4    Additional Strength Details  Reduced strength of glutes and hip stabilizers    Tests       Thoracic   Positive slump.     Additional Tests Details  Slump + on R and L with increase in numbness and tingling    General Comments     Spine Comments  R SIJ hypo mobile with + fwd bend and stork. RLE significantly longer than L with R ant rotation, R inflare, and R downslip       Objective     Active Range of Motion     Lumbar   Extension: with pain  Left lateral flexion: with pain  Right lateral flexion: with pain  Left Hip   External rotation (90/90): 30 degrees   Internal rotation (90/90): 30 degrees     Right Hip   External rotation (90/90): 35 degrees   Internal rotation (90/90): 35 degrees     Additional Active Range of Motion Details  L lateral lumbar flexion was more painful then R    Increase in numbness and tingling with extension  Flexion felt good but with return to neutral pain noticeable  Flexion 50-75% of normative values  Extension 25% of normative values    Strength/Myotome Testing     Left Hip   Planes of Motion   Flexion: 4-  Extension: 4  Abduction: 4-    Isolated Muscles   Gluteus ede: 4-    Right Hip   Planes of Motion   Flexion: 4  Extension: 4  Abduction: 4-    Isolated Muscles   Gluteus maximums: 4-    Left Knee   Flexion: 4  Extension: 4    Right Knee   Flexion: 4  Extension: 4    Additional Strength Details  Reduced strength of glutes and hip stabilizers    Tests       Thoracic   Positive slump.     Additional Tests Details  Slump + on R and L with increase in numbness and tingling    General Comments     Spine Comments  R SIJ hypo mobile with + fwd bend and stork. RLE significantly longer than L with R ant rotation, R inflare, and R downslip        Balance:  Romberg: Firm Eyes Open 30 sec, Eyes Closed 30 sec   Single leg: Firm Eyes Open <5 sec    Other Outcome Measures:  Other  Measures  Oswestry Disablity Index (YANELI): 15/50 = 30%    Treatments:     Therapeutic Exercise 08323:   Pt education on posture and alignment to include avoiding sitting with legs crossed.     Extra time spend on improved understanding of anatomy / physiology / pain science.    Education / performance / issuance of HEP below    HEP / Access Codes:   Access Code: [email or web address]  URL: [email or web address]  Date: 04/18/2025  Prepared by: Mejia Valdez    Exercises  - Supine Bridge  - 2 x daily - 7 x weekly - 2 sets - 10 reps - 5 hold  - Supine Hip Adduction Isometric with Ball  - 2 x daily - 7 x weekly - 2 sets - 10 reps - 5 hold    Assessment & Plan     Assessment  Impairments: abnormal muscle firing, abnormal or restricted ROM, impaired physical strength, lacks appropriate home exercise program, pain with function and weight-bearing intolerance  Assessment details: 64 year old female presents to outpatient physical therapy with reports of low back pain predominantly on the R side with intermittent sciatica down the B/L legs with neural tension testing. Impairments of reduced trunk/LE ROM, flexibility, strength, and pelvic mal-alignment presenting with + SI joint hypomobility impacting the  R LE with positive stork/forward bend test. The R LE is longer with downslip of R ischium, R infare of ilium, and R LE ant rotation. Pt would benefit from skilled PT services to address the deficits above to improve functional independence, facilitate pain free return to hobbies/work and improve ability to bend/lift/twist.  Prognosis: good    Plan  Planned modality interventions: cryotherapy, thermotherapy (hydrocollator packs), TENS and electrical stimulation/Russian stimulation  Planned therapy interventions: manual therapy, strengthening, stretching, therapeutic activities, gait training, balance/weight-bearing training, abdominal trunk stabilization, body mechanics training, home exercise program, flexibility, functional ROM  "exercises, joint mobilization, ADL retraining, IADL retraining, neuromuscular re-education, postural training, spinal/joint mobilization and soft tissue mobilization  Frequency: 2x week  Duration in visits: 16  Treatment plan discussed with: patient  Plan details: Consider pelvic floor therapist to discuss / assess pt as she has hx of urge incontinence.           Moderate complexity due to patient's clinical presentation being evolving with changing characteristics, with comorbidities/complexities to include age, hx of pelvic floor weakness, hx of incontinence, current radicular symptoms, all of which may negatively impact rehab tolerance and progression.     Post-Treatment Symptoms:   Not reported but improved understanding of anatomy / physiology / pain science.    Goals:   Active       PT Problem       PT Goal 1       Start:  04/18/25    Expected End:  05/16/25       Pt will be IND with HEP for improved strength and ROM         PT Goal 2       Start:  04/18/25    Expected End:  06/06/25       Pt will improve core and LE strength to 4+/5 or greater for improved activity tolerance.         PT Goal 3       Start:  04/18/25    Expected End:  06/06/25       Pt will improve LE and trunk ROM to >75% pain free normative values.         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          Patient Stated Goal 2       Start:  04/18/25    Expected End:  06/20/25       Pt will be able to initiate walking or transfers with < 2/10 pain, without having to walk \"approx 1/2 a mile\" to lessen pain, in order to improve activity tolerance and resume active lifestyle.           "

## 2025-04-21 ENCOUNTER — TREATMENT (OUTPATIENT)
Dept: PHYSICAL THERAPY | Facility: CLINIC | Age: 65
End: 2025-04-21
Payer: COMMERCIAL

## 2025-04-21 DIAGNOSIS — M54.50 PAIN, LOW BACK: ICD-10-CM

## 2025-04-21 DIAGNOSIS — G89.29 LUMBOSACRAL PAIN, CHRONIC: ICD-10-CM

## 2025-04-21 DIAGNOSIS — M54.50 LUMBOSACRAL PAIN, CHRONIC: ICD-10-CM

## 2025-04-21 DIAGNOSIS — M54.50 LUMBOSACRAL PAIN: Primary | Chronic | ICD-10-CM

## 2025-04-21 DIAGNOSIS — M54.16 LUMBAR RADICULITIS: ICD-10-CM

## 2025-04-21 PROCEDURE — 97110 THERAPEUTIC EXERCISES: CPT | Mod: GP

## 2025-04-21 ASSESSMENT — PAIN - FUNCTIONAL ASSESSMENT: PAIN_FUNCTIONAL_ASSESSMENT: 0-10

## 2025-04-21 ASSESSMENT — PAIN SCALES - GENERAL: PAINLEVEL_OUTOF10: 3

## 2025-04-21 NOTE — PROGRESS NOTES
Physical Therapy Treatment    Patient Name: Carlotta Mitchell  MRN: [id number]  Encounter date: 4/21/2025 PT Received On: 04/21/25  Response to Previous Treatment: Patient with no complaints from previous session.  Time Calculation  Start Time: 1443  Stop Time: 1521  Time Calculation (min): 38 min  PT Therapeutic Procedures Time Entry  Therapeutic Exercise Time Entry: 38    Visit # 2 of 16  Visits/Dates Authorized: 2025: MMO - NO AUTH / $3500 DED not met, then 100% COVERAGE / 40V ot/pt - 0 used / Availity [id number] / ds 4/17/25 //   CPT 88070 Not covered. CPT 69245 req auth //    Current Problem:   Problem List Items Addressed This Visit           ICD-10-CM    Pain, low back M54.50    Lumbosacral pain - Primary (Chronic) M54.50    Lumbar radiculitis (Chronic) M54.16     Other Visit Diagnoses         Codes      Lumbosacral pain, chronic     M54.50, G89.29          Precautions:  Precautions  STEADI Fall Risk Score (The score of 4 or more indicates an increased risk of falling): 2          Subjective   General: Pt reports having to perform exercises in the bed to avoid getting dog hair on her as she was staying at her sons house in [place]. As a result to performance of exercises in bed rather than floor has noted increased soreness in lower back.       Pre-Treatment Symptoms:   Pain Assessment: 0-10  0-10 (Numeric) Pain Score: 3  Pain Location: Back    Objective   +TTP at ASIS b/l (unable to determine which side was more sensitive when asked)  RLE significantly longer than L with R ant rotation, no inflare/outflare noted, and R downslip              Treatments:      Therapeutic Exercise 77593:   Sci fit bike x 8 min    Glute bridge (painful prior to MET and alignment)  Hip add iso into pillow x 10 sec holds mult reps  Glute bridge x 10 sec holds x 10 + reps  Clamshells x 5 sec holds x 10 reps    Pt education on leg length discrepancy (approx 1 in or greater but improved since evaluation)  Pt consent given for  muscle energy techniques and palpations    R SIJ hypo via + squish test and R + Hesch method for anterior rotation  RLE significantly longer than L with R ant rotation, no inflare/outflare noted, and R downslip   Correction of R depressed pube (RLE longer), R ant rotation, and R downslip    Improved symmetry post MET with additional benefits from performance / education / issuance of self MET to aid pt in reducing low back pain / tension and improving pelvic symmetry on off days of therapy.         Manual Therapy 69387: DNP  Due to time restraints unable to address but tenderness noted in ASIS region when palpating for ant/post rotations.     HEP / Access Codes:   Access Code: [email or web address]  URL: [email or web address]  Date: 04/18/2025  Prepared by: Mejia Valdez     Exercises  - Supine Bridge  - 2 x daily - 7 x weekly - 2 sets - 10 reps - 5 hold  - Supine Hip Adduction Isometric with Ball  - 2 x daily - 7 x weekly - 2 sets - 10 reps - 5 hold  Clamshells  Quad stretch  Hip abd in s/l      Assessment: Pt had good tolerance to session requiring extensive education on self MET for improved pain relief and pelvic / spinal symmetry to correct R ant innominate and L posterior. Majority of session spent on education / review / issuance of MET in hooklying followed by shot gun technique (hip add only)      Post-Treatment Symptoms:    No pain    Plan: Follow-up on HEP and progress as tolerated. Consider DN for low back or STM/MFR.       Goals:   Active       PT Problem       PT Goal 1       Start:  04/18/25    Expected End:  05/16/25       Pt will be IND with HEP for improved strength and ROM         PT Goal 2       Start:  04/18/25    Expected End:  06/06/25       Pt will improve core and LE strength to 4+/5 or greater for improved activity tolerance.         PT Goal 3       Start:  04/18/25    Expected End:  06/06/25       Pt will improve LE and trunk ROM to >75% pain free normative values.         PT Goal 4       Start:   "04/18/25    Expected End:  06/20/25       Pt to improve Low Back Disability Questionnaire by x 1 TARA         Patient Stated Goal 1       Start:  04/18/25    Expected End:  06/20/25       Pt will be able to return to golfing a full 18 holes with 2/10 pain or less.          Patient Stated Goal 2       Start:  04/18/25    Expected End:  06/20/25       Pt will be able to initiate walking or transfers with < 2/10 pain, without having to walk \"approx 1/2 a mile\" to lessen pain, in order to improve activity tolerance and resume active lifestyle.           "

## 2025-04-22 ENCOUNTER — APPOINTMENT (OUTPATIENT)
Facility: CLINIC | Age: 65
End: 2025-04-22
Payer: COMMERCIAL

## 2025-04-22 ENCOUNTER — APPOINTMENT (OUTPATIENT)
Dept: RADIOLOGY | Facility: HOSPITAL | Age: 65
End: 2025-04-22
Payer: COMMERCIAL

## 2025-04-22 VITALS
BODY MASS INDEX: 20.99 KG/M2 | DIASTOLIC BLOOD PRESSURE: 64 MMHG | WEIGHT: 126 LBS | SYSTOLIC BLOOD PRESSURE: 107 MMHG | HEIGHT: 65 IN

## 2025-04-22 DIAGNOSIS — Z01.419 WELL WOMAN EXAM: Primary | ICD-10-CM

## 2025-04-22 DIAGNOSIS — Z12.31 BREAST CANCER SCREENING BY MAMMOGRAM: ICD-10-CM

## 2025-04-22 DIAGNOSIS — Z12.4 CERVICAL CANCER SCREENING: ICD-10-CM

## 2025-04-22 PROCEDURE — 1123F ACP DISCUSS/DSCN MKR DOCD: CPT | Performed by: OBSTETRICS & GYNECOLOGY

## 2025-04-22 PROCEDURE — 88142 CYTOPATH C/V THIN LAYER: CPT

## 2025-04-22 PROCEDURE — 1160F RVW MEDS BY RX/DR IN RCRD: CPT | Performed by: OBSTETRICS & GYNECOLOGY

## 2025-04-22 PROCEDURE — 1036F TOBACCO NON-USER: CPT | Performed by: OBSTETRICS & GYNECOLOGY

## 2025-04-22 PROCEDURE — 1126F AMNT PAIN NOTED NONE PRSNT: CPT | Performed by: OBSTETRICS & GYNECOLOGY

## 2025-04-22 PROCEDURE — 87626 HPV SEP HI-RSK TYP&POOL RSLT: CPT

## 2025-04-22 PROCEDURE — 1159F MED LIST DOCD IN RCRD: CPT | Performed by: OBSTETRICS & GYNECOLOGY

## 2025-04-22 PROCEDURE — 99397 PER PM REEVAL EST PAT 65+ YR: CPT | Performed by: OBSTETRICS & GYNECOLOGY

## 2025-04-22 PROCEDURE — 3008F BODY MASS INDEX DOCD: CPT | Performed by: OBSTETRICS & GYNECOLOGY

## 2025-04-22 ASSESSMENT — ENCOUNTER SYMPTOMS
NAUSEA: 0
DYSURIA: 0
DIARRHEA: 0
ABDOMINAL DISTENTION: 0
COLOR CHANGE: 0
CONSTIPATION: 0
BACK PAIN: 0
UNEXPECTED WEIGHT CHANGE: 0
SHORTNESS OF BREATH: 0
HEMATURIA: 0
BLOOD IN STOOL: 0
ABDOMINAL PAIN: 0
APPETITE CHANGE: 0
FREQUENCY: 0
FEVER: 0
FATIGUE: 0
FLANK PAIN: 0
CHILLS: 0
SLEEP DISTURBANCE: 0
VOMITING: 0

## 2025-04-22 ASSESSMENT — PATIENT HEALTH QUESTIONNAIRE - PHQ9
1. LITTLE INTEREST OR PLEASURE IN DOING THINGS: NOT AT ALL
SUM OF ALL RESPONSES TO PHQ9 QUESTIONS 1 AND 2: 0
2. FEELING DOWN, DEPRESSED OR HOPELESS: NOT AT ALL

## 2025-04-22 ASSESSMENT — PAIN SCALES - GENERAL: PAINLEVEL_OUTOF10: 0-NO PAIN

## 2025-04-22 NOTE — PROGRESS NOTES
"History Of Present Illness  Routine Gyn Exam  Carlotta NELL Mitchell here for routine WWE.  Pt is postmenopausal.  Denies spotting or bleeding.     Concerns: none.   Using vaginal estradiol cream fairly regularly.     Medical and surgical histories reviewed with patient.   Exercise: walking.  Spends 2 weeks a month in [place] during winter months.      Gynecologic History  Postmenopausal.  Sexually active: Yes with one male partner/ .  Last Pap: .   Last mammogram: .       Obstetric History  OB History    Para Term  AB Living   4 4 4   4   SAB IAB Ectopic Multiple Live Births       4      # Outcome Date GA Lbr Alec/2nd Weight Sex Type Anes PTL Lv   4 Term 93    M Vag-Spont   JAYSON   3 Term 91    M Vag-Spont   JAYSON   2 Term 89    M Vag-Spont   JAYSON   1 Term 88    M Vag-Spont   JAYSON        Review of Systems   Constitutional:  Negative for appetite change, chills, fatigue, fever and unexpected weight change.   Respiratory:  Negative for shortness of breath.    Cardiovascular:  Negative for chest pain.   Gastrointestinal:  Negative for abdominal distention, abdominal pain, blood in stool, constipation, diarrhea, nausea and vomiting.   Endocrine: Negative for cold intolerance and heat intolerance.   Genitourinary:  Negative for dyspareunia, dysuria, flank pain, frequency, genital sores, hematuria, menstrual problem, pelvic pain, urgency, vaginal bleeding, vaginal discharge and vaginal pain.   Musculoskeletal:  Negative for back pain.   Skin:  Negative for color change.   Psychiatric/Behavioral:  Negative for sleep disturbance.        /64 (BP Location: Left arm, Patient Position: Sitting)   Ht 1.651 m (5' 5\")   Wt 57.2 kg (126 lb)   BMI 20.97 kg/m²      Physical Exam  Constitutional:       Appearance: Normal appearance.   HENT:      Head: Normocephalic and atraumatic.   Chest:   Breasts:     Right: Normal.      Left: Normal.   Abdominal:      General: Abdomen is flat.      " Palpations: Abdomen is soft.      Tenderness: There is no abdominal tenderness.   Genitourinary:     General: Normal vulva.      Vagina: Normal.      Cervix: Normal.      Uterus: Normal.       Adnexa: Right adnexa normal and left adnexa normal.      Comments: Pap collected today    Skin:     General: Skin is warm and dry.   Neurological:      Mental Status: She is alert and oriented to person, place, and time.   Psychiatric:         Mood and Affect: Mood normal.              Assessment/Plan         Routine Well Woman Exam Today  Discussed diet and exercise.   Reviewed routine health screenings.   Pap: pap done today   Recommend annual mammograms. Mammogram due 7/2025.                Mariama Whalen MD

## 2025-05-01 ENCOUNTER — TREATMENT (OUTPATIENT)
Dept: PHYSICAL THERAPY | Facility: CLINIC | Age: 65
End: 2025-05-01
Payer: COMMERCIAL

## 2025-05-01 DIAGNOSIS — M54.50 PAIN, LOW BACK: ICD-10-CM

## 2025-05-01 DIAGNOSIS — M54.50 LUMBOSACRAL PAIN, CHRONIC: ICD-10-CM

## 2025-05-01 DIAGNOSIS — G89.29 LUMBOSACRAL PAIN, CHRONIC: ICD-10-CM

## 2025-05-01 DIAGNOSIS — M54.16 LUMBAR RADICULITIS: ICD-10-CM

## 2025-05-01 DIAGNOSIS — M54.50 LUMBOSACRAL PAIN: Primary | Chronic | ICD-10-CM

## 2025-05-01 PROCEDURE — 97014 ELECTRIC STIMULATION THERAPY: CPT | Mod: GP

## 2025-05-01 PROCEDURE — 97530 THERAPEUTIC ACTIVITIES: CPT | Mod: GP

## 2025-05-01 PROCEDURE — 97110 THERAPEUTIC EXERCISES: CPT | Mod: GP

## 2025-05-01 ASSESSMENT — PAIN SCALES - GENERAL: PAINLEVEL_OUTOF10: 3

## 2025-05-01 ASSESSMENT — PAIN - FUNCTIONAL ASSESSMENT: PAIN_FUNCTIONAL_ASSESSMENT: 0-10

## 2025-05-01 NOTE — PROGRESS NOTES
Physical Therapy Treatment    Patient Name: Carlotta Mitchell  MRN: [id number]  Encounter date: 5/1/2025 PT Received On: 05/01/25  Response to Previous Treatment: Patient with no complaints from previous session.  Time Calculation  Start Time: 1144  Stop Time: 1230  Time Calculation (min): 46 min  PT Modalities Time Entry  E-Stim (Unattended) Time Entry: 12  PT Therapeutic Procedures Time Entry  Therapeutic Exercise Time Entry: 15  Therapeutic Activity Time Entry: 19    Visit # 3 of 16  Visits/Dates Authorized: 2025: MMO - NO AUTH / $3500 DED not met, then 100% COVERAGE / 40V ot/pt - 0 used / Availity [id number] / ds 4/17/25 //   CPT 28475 Not covered. CPT 34390 req auth //    Current Problem:   Problem List Items Addressed This Visit           ICD-10-CM    Pain, low back M54.50    Lumbosacral pain - Primary (Chronic) M54.50    Lumbar radiculitis (Chronic) M54.16     Other Visit Diagnoses         Codes      Lumbosacral pain, chronic     M54.50, G89.29          Precautions:  Precautions  STEADI Fall Risk Score (The score of 4 or more indicates an increased risk of falling): 2          Subjective   General: Pt reports no improvement as of yet. Had to stop performing clamshell as a result of hip pain.        Pre-Treatment Symptoms:   Pain Assessment: 0-10  0-10 (Numeric) Pain Score: 3  Pain Location: Back    Objective   Significant reduced ROM in L GS ankle dorsi   +TTP at ASIS b/l (unable to determine which side was more sensitive when asked)  RLE significantly longer than L with R ant rotation, no inflare/outflare noted, and R downslip              Treatments:      Therapeutic Exercise 63088:   Sci fit bike x 8 min or Nu-step  HS and GS stretching B/L   Reduced mobility with L ankle dorsiflexion    Therapeutic Activity 82767:   Pt education on anatmy / physiology / body mechanics. Discuss of hx of L ankle injury (September while on vacation). Upon further investigation pt had significantly reduced mobility of LLE  which could possibly be attributing to compensatory mechanisms and R sided hip/LBP.  Education on benefits of DN services for blood flow, histamine, neurophysiological effect and release of endorphins prior to DN + estim.       Estim + DN to R gluteal muscles and pirifomris. X 12 min total (10 +2 set-up)      Deferred  Glute bridge (painful prior to MET and alignment)  Hip add iso into pillow x 10 sec holds mult reps  Glute bridge x 10 sec holds x 10 + reps  Clamshells x 5 sec holds x 10 reps (HOLD for now)  Pt education on leg length discrepancy (approx 1 in or greater but improved since evaluation)  Pt consent given for muscle energy techniques and palpations  R SIJ hypo via + squish test and R + Hesch method for anterior rotation  RLE significantly longer than L with R ant rotation, no inflare/outflare noted, and R downslip   Correction of R depressed pube (RLE longer), R ant rotation, and R downslip  Improved symmetry post MET with additional benefits from performance / education / issuance of self MET to aid pt in reducing low back pain / tension and improving pelvic symmetry on off days of therapy.         HEP / Access Codes:   Access Code: [email or web address]  URL: [email or web address]  Date: 04/18/2025  Prepared by: Mejia Valdez     Exercises  - Supine Bridge  - 2 x daily - 7 x weekly - 2 sets - 10 reps - 5 hold  - Supine Hip Adduction Isometric with Ball  - 2 x daily - 7 x weekly - 2 sets - 10 reps - 5 hold  Clamshells  Quad stretch  Hip abd in s/l      Assessment: Majority of session cont to be spent on education / for anatomy / physiology with additional adjustments in HEP to include GS and HS stretching and elimination of clamshells secondary to R hip pain. Pt seems to have enjoyed dry needling and estim services.      Post-Treatment Symptoms:    No pain    Plan: Follow-up on HEP and progress as tolerated. Follow up on Estim and DN       Goals:   Active       PT Problem       PT Goal 1       Start:   "04/18/25    Expected End:  05/16/25       Pt will be IND with HEP for improved strength and ROM         PT Goal 2       Start:  04/18/25    Expected End:  06/06/25       Pt will improve core and LE strength to 4+/5 or greater for improved activity tolerance.         PT Goal 3       Start:  04/18/25    Expected End:  06/06/25       Pt will improve LE and trunk ROM to >75% pain free normative values.         PT Goal 4       Start:  04/18/25    Expected End:  06/20/25       Pt to improve Low Back Disability Questionnaire by x 1 TARA         Patient Stated Goal 1       Start:  04/18/25    Expected End:  06/20/25       Pt will be able to return to [place] a full 18 holes with 2/10 pain or less.          Patient Stated Goal 2       Start:  04/18/25    Expected End:  06/20/25       Pt will be able to initiate walking or transfers with < 2/10 pain, without having to walk \"approx 1/2 a mile\" to lessen pain, in order to improve activity tolerance and resume active lifestyle.           "

## 2025-05-08 ENCOUNTER — TREATMENT (OUTPATIENT)
Dept: PHYSICAL THERAPY | Facility: CLINIC | Age: 65
End: 2025-05-08
Payer: COMMERCIAL

## 2025-05-08 DIAGNOSIS — M54.16 LUMBAR RADICULITIS: ICD-10-CM

## 2025-05-08 DIAGNOSIS — M54.50 LUMBOSACRAL PAIN: Primary | Chronic | ICD-10-CM

## 2025-05-08 DIAGNOSIS — M54.50 PAIN, LOW BACK: ICD-10-CM

## 2025-05-08 DIAGNOSIS — M54.50 LUMBOSACRAL PAIN, CHRONIC: ICD-10-CM

## 2025-05-08 DIAGNOSIS — G89.29 LUMBOSACRAL PAIN, CHRONIC: ICD-10-CM

## 2025-05-08 LAB
CYTOLOGY CMNT CVX/VAG CYTO-IMP: NORMAL
HPV HR 12 DNA GENITAL QL NAA+PROBE: NEGATIVE
HPV HR GENOTYPES PNL CVX NAA+PROBE: NEGATIVE
HPV16 DNA SPEC QL NAA+PROBE: NEGATIVE
HPV18 DNA SPEC QL NAA+PROBE: NEGATIVE
LAB AP HPV GENOTYPE QUESTION: YES
LAB AP HPV HR: NORMAL
LABORATORY COMMENT REPORT: NORMAL
LABORATORY COMMENT REPORT: NORMAL
PATH REPORT.TOTAL CANCER: NORMAL

## 2025-05-08 PROCEDURE — 97014 ELECTRIC STIMULATION THERAPY: CPT | Mod: GP

## 2025-05-08 PROCEDURE — 97110 THERAPEUTIC EXERCISES: CPT | Mod: GP

## 2025-05-08 ASSESSMENT — PAIN - FUNCTIONAL ASSESSMENT: PAIN_FUNCTIONAL_ASSESSMENT: 0-10

## 2025-05-08 ASSESSMENT — PAIN SCALES - GENERAL: PAINLEVEL_OUTOF10: 0 - NO PAIN

## 2025-05-08 NOTE — PROGRESS NOTES
"Physical Therapy Treatment    Patient Name: Carlotta Mitchell  MRN: [id number]  Encounter date: 5/8/2025 PT Received On: 05/08/25  Response to Previous Treatment: Patient with no complaints from previous session.  Time Calculation  Start Time: 1016  Stop Time: 1059  Time Calculation (min): 43 min  PT Modalities Time Entry  E-Stim (Unattended) Time Entry: 10  PT Therapeutic Procedures Time Entry  Therapeutic Exercise Time Entry: 24    Visit # 4 of 16  Visits/Dates Authorized: 2025: MMO - NO AUTH / $3500 DED not met, then 100% COVERAGE / 40V ot/pt - 0 used / Availity [id number] / ds 4/17/25 //   CPT 69290 Not covered. CPT 12354 req auth //    Current Problem:   Problem List Items Addressed This Visit           ICD-10-CM    Pain, low back M54.50    Lumbosacral pain - Primary (Chronic) M54.50    Lumbar radiculitis (Chronic) M54.16     Other Visit Diagnoses         Codes      Lumbosacral pain, chronic     M54.50, G89.29          Precautions:  Precautions  STEADI Fall Risk Score (The score of 4 or more indicates an increased risk of falling): 2          Subjective   General: Pt denies pain when presenting for session today but did get off the couch last night and felt a \"twinge.\"  She feels good right now but lacks some confidence feeling if she was to make \"one wrong move\" symptoms would com back.     Reason for Referral: physical therapy    Pre-Treatment Symptoms:   Pain Assessment: 0-10  0-10 (Numeric) Pain Score: 0 - No pain    Objective   +TTP at  R piriformis with additional tenderness at R glute med with radiating symptoms upon palpation.             Treatments:      Therapeutic Exercise 23960:   No Nu-step as pt has increase in knee pain \"its tougher on my knees than the bicycle\"    Sci fit bike x 8 min (level 2.2)    Stair stretching 3 reps alt as needed x 30 sec   calf stretching    Heel hang    HS stretch 30 sec holds   Hip flex lunge 30 sec holds         Electrical Nerve Stimulation via dry needling " following informed consent; 2Hz, mA to tolerance, applied to R glutes and R piriformis, for 10 minutes.   DN applied to the following tissues noted R glute and R piriformis following informed consent after review of all contraindications, precautions, indications. Proper sanitization and prepping techniques utilized to prepare the region while ensuring the use of sterile single use dry needles maintaining all infection control policies followed during this visit.  Positions in L side lying with R hip up.      HEP / Access Codes:   Access Code: [email or web address]  URL: [email or web address]  Date: 05/08/2025  Prepared by: Mejia Valdez    Exercises  - Supine Bridge  - 2 x daily - 7 x weekly - 2 sets - 10 reps - 5-10 hold  - Supine Hip Adduction Isometric with Ball  - 2 x daily - 7 x weekly - 2 sets - 10 reps - 5-10 hold  - Clamshell with Resistance  - 2 x daily - 7 x weekly - 2 sets - 10 reps  - Sidelying Hip Abduction  - 2 x daily - 7 x weekly - 2 sets - 10 reps  - Prone Quadriceps Stretch with Strap  - 2 x daily - 7 x weekly - 2 sets - 3 reps - 30 hold  - Standing Hamstring Stretch with Step  - 2 x daily - 7 x weekly - 3 sets - 30 hold  - Hip Flexor Stretch on Step  - 2 x daily - 7 x weekly - 3 sets - 30 hold  - Standing Bilateral Gastroc Stretch with Step  - 2 x daily - 7 x weekly - 3 sets - 30 hold      Assessment: Emphasis of session cont to be spent on education / for anatomy / physiology with additional adjustments for HEP to include stair stretching for hip flexor, GS and HS stretching. Pt continues to see benefits from dry needling and estim services with pt noticing improve sensation of intensity in estim today.      Post-Treatment Symptoms:    No pain    Plan: Follow-up on HEP and progress as tolerated. Follow up on Estim and DN       Goals:   Active       PT Problem       PT Goal 1       Start:  04/18/25    Expected End:  05/16/25       Pt will be IND with HEP for improved strength and ROM         PT Goal 2   "     Start:  04/18/25    Expected End:  06/06/25       Pt will improve core and LE strength to 4+/5 or greater for improved activity tolerance.         PT Goal 3       Start:  04/18/25    Expected End:  06/06/25       Pt will improve LE and trunk ROM to >75% pain free normative values.         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          Patient Stated Goal 2       Start:  04/18/25    Expected End:  06/20/25       Pt will be able to initiate walking or transfers with < 2/10 pain, without having to walk \"approx 1/2 a mile\" to lessen pain, in order to improve activity tolerance and resume active lifestyle.           "

## 2025-05-12 ENCOUNTER — TREATMENT (OUTPATIENT)
Dept: PHYSICAL THERAPY | Facility: CLINIC | Age: 65
End: 2025-05-12
Payer: COMMERCIAL

## 2025-05-12 DIAGNOSIS — M54.50 LUMBOSACRAL PAIN: Primary | Chronic | ICD-10-CM

## 2025-05-12 DIAGNOSIS — M54.50 PAIN, LOW BACK: ICD-10-CM

## 2025-05-12 DIAGNOSIS — M54.50 LUMBOSACRAL PAIN, CHRONIC: ICD-10-CM

## 2025-05-12 DIAGNOSIS — M54.16 LUMBAR RADICULITIS: ICD-10-CM

## 2025-05-12 DIAGNOSIS — G89.29 LUMBOSACRAL PAIN, CHRONIC: ICD-10-CM

## 2025-05-12 PROCEDURE — 97014 ELECTRIC STIMULATION THERAPY: CPT | Mod: GP

## 2025-05-12 PROCEDURE — 97110 THERAPEUTIC EXERCISES: CPT | Mod: GP

## 2025-05-12 PROCEDURE — 97530 THERAPEUTIC ACTIVITIES: CPT | Mod: GP

## 2025-05-12 ASSESSMENT — PAIN SCALES - GENERAL: PAINLEVEL_OUTOF10: 3

## 2025-05-12 ASSESSMENT — PAIN - FUNCTIONAL ASSESSMENT: PAIN_FUNCTIONAL_ASSESSMENT: 0-10

## 2025-05-12 NOTE — PROGRESS NOTES
"Physical Therapy Treatment    Patient Name: Carlotta Mitchell \"Tawanna\"  MRN: [id number]  Encounter date: 5/12/2025    Time Calculation  Start Time: 0135  Stop Time: 0240  Time Calculation (min): 65 min  PT Modalities Time Entry  E-Stim (Unattended) Time Entry: 10 (plus set up)  PT Therapeutic Procedures Time Entry  Therapeutic Exercise Time Entry: 35  Therapeutic Activity Time Entry: 15    Visit # 5 of 16  Visits/Dates Authorized: 40V pt/ot   2025: MMO - NO AUTH / $3500 DED not met, then 100% COVERAGE / 40V ot/pt - 0 used / Availity [id number] / ds 4/17/25 //   CPT 18072 Not covered. CPT 42525 req auth //    Current Problem:   Problem List Items Addressed This Visit           ICD-10-CM    Pain, low back M54.50    Lumbosacral pain, chronic - Primary M54.50, G89.29    Lumbar radiculitis (Chronic) M54.16     Precautions:   STEADI Fall Risk Score (The score of 4 or more indicates an increased risk of falling): 2           Subjective   General:   Pt states had a really good day of no pain after last session with the dry needling. Notes having some discomfort today. Did her HEP and had to put on some icy hot. Felt better after her shower. Notes [place] as sleeping in a different bed may have played a role in her back pain. No current leg symptoms.      Pre-Treatment Symptoms:   Pain Assessment: 0-10  0-10 (Numeric) Pain Score: 3    Objective   +TTP at  R piriformis with additional tenderness at R glute med with radiating symptoms upon palpation.            Treatments:  Therapeutic Exercise 72532:   No Nu-step as pt has increase in knee pain \"its tougher on my knees than the bicycle\"  Sci fit bike x 8 min (level 2.2)  LTR x15   SKTC with towel 10x5\" hld   Towel active HS stretch x15   Bridge x10 uncomfortable cues to engage glutes   HL hip add/ball squeeze 60\"x5\" hld   HL hip tloop abd squeeze 60\"x5\" hld   Clam/rev clam x10 ea   Open book x10     DNP:  Stair stretching 3 reps " alt as needed x 30 sec              calf stretching                          Heel hang               HS stretch 30 sec holds              Hip flex lunge 30 sec holds     Therapeutic Activity 38074:   Pt with prior pelvic floor concerns. Discussed symptoms and educated pt on PFPT and treatment options. Pt demo good understanding and states does not require further assistance at this point.     Modalities:   Electrical Nerve Stimulation via dry needling following informed consent; 2Hz, mA to tolerance, applied to R glutes and R piriformis, for 10 minutes. Pt lying prone with pillow under hip and knee.   DN applied to the following tissues noted R glute and R piriformis following informed consent after review of all contraindications, precautions, indications. Proper sanitization and prepping techniques utilized to prepare the region while ensuring the use of sterile single use dry needles maintaining all infection control policies followed during this visit.  Positions in L side lying with R hip up.    HEP / Access Codes URL: [email or web address]:  Access Code: [email or web address]  URL: [email or web address]  Date: 05/08/2025  Prepared by: Mejia Valdez     Exercises  - Supine Bridge  - 2 x daily - 7 x weekly - 2 sets - 10 reps - 5-10 hold  - Supine Hip Adduction Isometric with Ball  - 2 x daily - 7 x weekly - 2 sets - 10 reps - 5-10 hold  - Clamshell with Resistance  - 2 x daily - 7 x weekly - 2 sets - 10 reps  - Sidelying Hip Abduction  - 2 x daily - 7 x weekly - 2 sets - 10 reps  - Prone Quadriceps Stretch with Strap  - 2 x daily - 7 x weekly - 2 sets - 3 reps - 30 hold  - Standing Hamstring Stretch with Step  - 2 x daily - 7 x weekly - 3 sets - 30 hold  - Hip Flexor Stretch on Step  - 2 x daily - 7 x weekly - 3 sets - 30 hold  - Standing Bilateral Gastroc Stretch with Step  - 2 x daily - 7 x weekly - 3 sets - 30 hold    Assessment:   Pt tolerated session well. Min discomfort with bridge and clamshell. Favorable  "response to LTR and open book. PT educated pt regarding pelvic floor. No issues to address currently.   Attempted to needled lumbar paraspinals as well since pt experiencing more low back pain vs buttock symptoms and no sig TTP over right glute/piriformis. Pt verbalized pain in low back and took needles out and disregarded. Denies any symptoms traveling down leg or pins/needles or burning sensation. States glutes prior had felt \"tingly\" and the back sensation was not the same. Completed DN to right glute without issue.      Post-Treatment Symptoms:    Improved     Plan: Follow-up on HEP and progress as tolerated. Follow up on Estim and DN        Goals:   Active       PT Problem       PT Goal 1       Start:  04/18/25    Expected End:  05/16/25       Pt will be IND with HEP for improved strength and ROM         PT Goal 2       Start:  04/18/25    Expected End:  06/06/25       Pt will improve core and LE strength to 4+/5 or greater for improved activity tolerance.         PT Goal 3       Start:  04/18/25    Expected End:  06/06/25       Pt will improve LE and trunk ROM to >75% pain free normative values.         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          Patient Stated Goal 2       Start:  04/18/25    Expected End:  06/20/25       Pt will be able to initiate walking or transfers with < 2/10 pain, without having to walk \"approx 1/2 a mile\" to lessen pain, in order to improve activity tolerance and resume active lifestyle.           "

## 2025-05-15 ENCOUNTER — APPOINTMENT (OUTPATIENT)
Dept: PHYSICAL THERAPY | Facility: CLINIC | Age: 65
End: 2025-05-15
Payer: COMMERCIAL

## 2025-05-19 ENCOUNTER — APPOINTMENT (OUTPATIENT)
Dept: PHYSICAL THERAPY | Facility: CLINIC | Age: 65
End: 2025-05-19
Payer: COMMERCIAL

## 2025-05-22 ENCOUNTER — TREATMENT (OUTPATIENT)
Dept: PHYSICAL THERAPY | Facility: CLINIC | Age: 65
End: 2025-05-22
Payer: COMMERCIAL

## 2025-05-22 DIAGNOSIS — M54.50 PAIN, LOW BACK: ICD-10-CM

## 2025-05-22 DIAGNOSIS — G89.29 LUMBOSACRAL PAIN, CHRONIC: ICD-10-CM

## 2025-05-22 DIAGNOSIS — M54.50 LUMBOSACRAL PAIN, CHRONIC: ICD-10-CM

## 2025-05-22 DIAGNOSIS — M54.16 LUMBAR RADICULITIS: ICD-10-CM

## 2025-05-22 PROCEDURE — 97110 THERAPEUTIC EXERCISES: CPT | Mod: GP

## 2025-05-22 PROCEDURE — 97112 NEUROMUSCULAR REEDUCATION: CPT | Mod: GP

## 2025-05-22 PROCEDURE — 97014 ELECTRIC STIMULATION THERAPY: CPT | Mod: GP

## 2025-05-22 ASSESSMENT — PAIN - FUNCTIONAL ASSESSMENT: PAIN_FUNCTIONAL_ASSESSMENT: 0-10

## 2025-05-22 ASSESSMENT — PAIN SCALES - GENERAL: PAINLEVEL_OUTOF10: 4

## 2025-05-22 NOTE — PROGRESS NOTES
"Physical Therapy Treatment    Patient Name: Carlotta Mitchell \"Tawanna\"  MRN: [id number]  Encounter date: 5/22/2025 PT Received On: 05/22/25  Response to Previous Treatment: Patient unable to report, no changes reported from family or staff (see subjective)  Time Calculation  Start Time: 1145  Stop Time: 1236  Time Calculation (min): 51 min  PT Modalities Time Entry  E-Stim (Unattended) Time Entry: 8  PT Therapeutic Procedures Time Entry  Neuromuscular Re-Education Time Entry: 32  Therapeutic Exercise Time Entry: 11    Visit # 6 of 16  Visits/Dates Authorized: 40V pt/ot   2025: MMO - NO AUTH / $3500 DED not met, then 100% COVERAGE / 40V ot/pt - 0 used / Availity [id number] / ds 4/17/25 //   CPT 63986 Not covered. CPT 07538 req auth //    Current Problem:   Problem List Items Addressed This Visit           ICD-10-CM    Pain, low back M54.50    Lumbosacral pain, chronic M54.50, G89.29    Lumbar radiculitis (Chronic) M54.16     Precautions:   STEADI Fall Risk Score (The score of 4 or more indicates an increased risk of falling): 2           Subjective   General: Pt had poor tolerance to trial of DN to low back noting that needles had to be removed from low back as it was very painful. She is able to currently control pain secondary to PT exercises and icy hot but pain today still at a 4/10 at worst.     Pre-Treatment Symptoms:   Pain Assessment: 0-10  0-10 (Numeric) Pain Score: 4    Objective   +TTP at  R psoas with R ant rotation noted and R LLD (R longer)           Treatments:    Therapeutic Exercise 38425:   No Nu-step as pt has increase in knee pain \"its tougher on my knees than the bicycle\"  Sci fit bike x 8 min (level 2.0)    Stair stretching 3 reps alt as needed x 30 sec              calf stretching                          Slant board x 2 min              HS stretch 30 sec holds              Hip flex lunge 30 sec holds     Neuro beltran: 63064  +Hesch method R ant rotation SKTC x 2 min w/ improved spring post.  Pt " presents with pelvic/sacral asymmetry with SI joint mobility of R side evident by a + squish test / stork and forward bend. Pt presents with R LE longer then the other with a pubic down-slip on the R side (R ischial tub lower), no flare noted on Rside, and ant rotation on the R side.   - R on R sacral torsion stuck in ext  - R uni stuck in ext post torsion correction    MET for RLE longer and L shorter 3 x 10 sec holds + 10 sec hip add iso  MET to correct R ant rotation 3 x 10 sec    Modalities:   Electrical Nerve Stimulation via dry needling following informed consent; 2Hz, mA to tolerance, applied to R glute med, for 8 minutes. Pt Left side lying prone with pillow under waist  DN applied to the following tissues noted R glute med with additional x 1 needle to R QL and x 1 R psoas with S/L tech following informed consent after review of all contraindications, precautions, indications.   Proper sanitization and prepping techniques utilized to prepare the region while ensuring the use of sterile single use dry needles maintaining all infection control policies followed during this visit with no adverse reaction noted. Relief reported.      HEP / Access Codes URL: [email or web address]  Access Code: [email or web address]  URL: [email or web address]  Date: 05/08/2025  Prepared by: Mejia Valdez     Exercises  - Supine Bridge  - 2 x daily - 7 x weekly - 2 sets - 10 reps - 5-10 hold  - Supine Hip Adduction Isometric with Ball  - 2 x daily - 7 x weekly - 2 sets - 10 reps - 5-10 hold  - Clamshell with Resistance  - 2 x daily - 7 x weekly - 2 sets - 10 reps  - Sidelying Hip Abduction  - 2 x daily - 7 x weekly - 2 sets - 10 reps  - Prone Quadriceps Stretch with Strap  - 2 x daily - 7 x weekly - 2 sets - 3 reps - 30 hold  - Standing Hamstring Stretch with Step  - 2 x daily - 7 x weekly - 3 sets - 30 hold  - Hip Flexor Stretch on Step  - 2 x daily - 7 x weekly - 3 sets - 30 hold  - Standing Bilateral Gastroc Stretch with Step  " - 2 x daily - 7 x weekly - 3 sets - 30 hold    Assessment:   Pt tolerated session well noted relief of symptoms post DN and TENs. Pt had additional relief with DN tech to R psoas and R QL in left side lying tech. No adverse reactions noted and sterile tech upheld. Pt had sig R psoas tenderness and R QL tenderness which was persistent after MET for improved pelvic alignment but pt thinks DN helped.      Post-Treatment Symptoms:    Improved     Plan: Follow-up on HEP and progress as tolerated. Follow up on Estim and DN        Goals:   Active       PT Problem       PT Goal 1       Start:  04/18/25    Expected End:  05/16/25       Pt will be IND with HEP for improved strength and ROM         PT Goal 2       Start:  04/18/25    Expected End:  06/06/25       Pt will improve core and LE strength to 4+/5 or greater for improved activity tolerance.         PT Goal 3       Start:  04/18/25    Expected End:  06/06/25       Pt will improve LE and trunk ROM to >75% pain free normative values.         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          Patient Stated Goal 2       Start:  04/18/25    Expected End:  06/20/25       Pt will be able to initiate walking or transfers with < 2/10 pain, without having to walk \"approx 1/2 a mile\" to lessen pain, in order to improve activity tolerance and resume active lifestyle.           "

## 2025-05-27 ENCOUNTER — APPOINTMENT (OUTPATIENT)
Dept: PHYSICAL THERAPY | Facility: CLINIC | Age: 65
End: 2025-05-27
Payer: COMMERCIAL

## 2025-05-27 ENCOUNTER — TREATMENT (OUTPATIENT)
Dept: PHYSICAL THERAPY | Facility: CLINIC | Age: 65
End: 2025-05-27
Payer: COMMERCIAL

## 2025-05-27 DIAGNOSIS — G89.29 LUMBOSACRAL PAIN, CHRONIC: ICD-10-CM

## 2025-05-27 DIAGNOSIS — M54.16 LUMBAR RADICULITIS: ICD-10-CM

## 2025-05-27 DIAGNOSIS — M54.50 LUMBOSACRAL PAIN, CHRONIC: ICD-10-CM

## 2025-05-27 DIAGNOSIS — M54.50 PAIN, LOW BACK: ICD-10-CM

## 2025-05-27 PROCEDURE — 97110 THERAPEUTIC EXERCISES: CPT | Mod: GP

## 2025-05-27 PROCEDURE — 97112 NEUROMUSCULAR REEDUCATION: CPT | Mod: GP

## 2025-05-27 ASSESSMENT — PAIN SCALES - GENERAL: PAINLEVEL_OUTOF10: 0 - NO PAIN

## 2025-05-27 ASSESSMENT — PAIN - FUNCTIONAL ASSESSMENT: PAIN_FUNCTIONAL_ASSESSMENT: 0-10

## 2025-05-27 NOTE — PROGRESS NOTES
"Physical Therapy Treatment    Patient Name: Carlotta Mitchell \"Tawanna\"  MRN: [id number]  Encounter date: 5/27/2025 PT Received On: 05/27/25  Response to Previous Treatment: Patient with no complaints from previous session.  Time Calculation  Start Time: 1054  Stop Time: 1139  Time Calculation (min): 45 min  PT Therapeutic Procedures Time Entry  Neuromuscular Re-Education Time Entry: 12  Therapeutic Exercise Time Entry: 30    Visit # 7 of 16  Visits/Dates Authorized: 40V pt/ot   2025: MMO - NO AUTH / $3500 DED not met, then 100% COVERAGE / 40V ot/pt - 0 used / Availity [id number] / ds 4/17/25 //   CPT 86847 Not covered. CPT 87213 req auth //    Current Problem:   Problem List Items Addressed This Visit           ICD-10-CM    Pain, low back M54.50    Lumbosacral pain, chronic M54.50, G89.29    Lumbar radiculitis (Chronic) M54.16     Precautions:   STEADI Fall Risk Score (The score of 4 or more indicates an increased risk of falling): 2           Subjective   General: Pt had wonderful tolerance to last session noting no issues over the weekend. Pt reports no pain this weekend.     Pre-Treatment Symptoms:   Pain Assessment: 0-10  0-10 (Numeric) Pain Score: 0 - No pain    Objective   +R SI joint hypo with stork and fwd bend   +TTP b/l psoas and hip flexor R>L           Treatments:    Therapeutic Exercise 78741:   No Nu-step as pt has increase in knee pain \"its tougher on my knees than the bicycle\"    Sci fit bike x 7 min (level 3.0)  Exercises  - glute bridges x 10 sec holds x 10 reps  - Dead Bug  1.5# on LE and 2.5# on LE  -cat cow (no issues and able to tolerated on mat table with hx of knee replacement)  Pt education on newly added HEP to include dead bug + self MET (see NMR)    Neuro beltran: [id number]  Extensive pt education on self alignment tech for \"push and pull\" 3 x 10 sec holds followed by  Shotgun  Hip abd 3 x 10 sec  Hip add/pillow 3 x 10    Performed mult reps at beginning and later in session to ensure " "understanding of MET tech.      HEP / Access Codes URL: [email or web address]  Access Code: [email or web address]  URL: [email or web address]  Date: 05/08/2025  Prepared by: Mejia Valdez     Exercises  - Supine Bridge  - 2 x daily - 7 x weekly - 2 sets - 10 reps - 5-10 hold  - Supine Hip Adduction Isometric with Ball  - 2 x daily - 7 x weekly - 2 sets - 10 reps - 5-10 hold  - Clamshell with Resistance  - 2 x daily - 7 x weekly - 2 sets - 10 reps  - Sidelying Hip Abduction  - 2 x daily - 7 x weekly - 2 sets - 10 reps  - Prone Quadriceps Stretch with Strap  - 2 x daily - 7 x weekly - 2 sets - 3 reps - 30 hold  - Standing Hamstring Stretch with Step  - 2 x daily - 7 x weekly - 3 sets - 30 hold  - Hip Flexor Stretch on Step  - 2 x daily - 7 x weekly - 3 sets - 30 hold  - Standing Bilateral Gastroc Stretch with Step  - 2 x daily - 7 x weekly - 3 sets - 30 hold    Access Code: [email or web address]  URL: [email or web address]  Date: 05/27/2025  Prepared by: Mejia Valdez    Exercises  - Dead Bug  - 2 x daily - 7 x weekly - 3 sets - 10 reps    Assessment:   Pt had good understanding and performance of self MET to include \"push / pull\" strain counter strain R/L followed by shotgun technique. MET was non-specific for rotated inominent but improved leg length (R longer) and improved pelvic symmetry noted post MET. Additionally pt reported less tenderness in b/l psoas and hip flexors post. Discontinued pt demonstrated exercises (alt SLR in supine) and added controlled core stabilization exercise to include dead bug.     Post-Treatment Symptoms:    Pressure in back with dead bug.    Plan: Follow-up issued deadbug exercise which pt is performing at home.       Goals:   Active       PT Problem       PT Goal 1       Start:  04/18/25    Expected End:  05/16/25       Pt will be IND with HEP for improved strength and ROM         PT Goal 2       Start:  04/18/25    Expected End:  06/06/25       Pt will improve core and LE strength to 4+/5 " "or greater for improved activity tolerance.         PT Goal 3       Start:  04/18/25    Expected End:  06/06/25       Pt will improve LE and trunk ROM to >75% pain free normative values.         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          Patient Stated Goal 2       Start:  04/18/25    Expected End:  06/20/25       Pt will be able to initiate walking or transfers with < 2/10 pain, without having to walk \"approx 1/2 a mile\" to lessen pain, in order to improve activity tolerance and resume active lifestyle.           "

## 2025-05-30 ENCOUNTER — APPOINTMENT (OUTPATIENT)
Dept: PHYSICAL THERAPY | Facility: CLINIC | Age: 65
End: 2025-05-30
Payer: COMMERCIAL

## 2025-06-02 ENCOUNTER — APPOINTMENT (OUTPATIENT)
Dept: PHYSICAL THERAPY | Facility: CLINIC | Age: 65
End: 2025-06-02
Payer: COMMERCIAL

## 2025-06-09 ENCOUNTER — APPOINTMENT (OUTPATIENT)
Dept: PHYSICAL THERAPY | Facility: CLINIC | Age: 65
End: 2025-06-09
Payer: COMMERCIAL

## 2025-06-09 LAB
25(OH)D3+25(OH)D2 SERPL-MCNC: 73 NG/ML (ref 30–100)
ALBUMIN SERPL-MCNC: 4.5 G/DL (ref 3.6–5.1)
ALP SERPL-CCNC: 36 U/L (ref 37–153)
ALT SERPL-CCNC: 17 U/L (ref 6–29)
ANION GAP SERPL CALCULATED.4IONS-SCNC: 7 MMOL/L (CALC) (ref 7–17)
AST SERPL-CCNC: 20 U/L (ref 10–35)
BASOPHILS # BLD AUTO: 10 CELLS/UL (ref 0–200)
BASOPHILS NFR BLD AUTO: 0.3 %
BILIRUB SERPL-MCNC: 0.8 MG/DL (ref 0.2–1.2)
BUN SERPL-MCNC: 17 MG/DL (ref 7–25)
CALCIUM SERPL-MCNC: 8.9 MG/DL (ref 8.6–10.4)
CHLORIDE SERPL-SCNC: 107 MMOL/L (ref 98–110)
CHOLEST SERPL-MCNC: 208 MG/DL
CHOLEST/HDLC SERPL: 2.5 (CALC)
CO2 SERPL-SCNC: 27 MMOL/L (ref 20–32)
CREAT SERPL-MCNC: 0.96 MG/DL (ref 0.5–1.05)
EGFRCR SERPLBLD CKD-EPI 2021: 66 ML/MIN/1.73M2
EOSINOPHIL # BLD AUTO: 99 CELLS/UL (ref 15–500)
EOSINOPHIL NFR BLD AUTO: 2.9 %
ERYTHROCYTE [DISTWIDTH] IN BLOOD BY AUTOMATED COUNT: 19.5 % (ref 11–15)
GLUCOSE SERPL-MCNC: 91 MG/DL (ref 65–99)
HCT VFR BLD AUTO: 33.1 % (ref 35–45)
HDLC SERPL-MCNC: 82 MG/DL
HGB BLD-MCNC: 10.7 G/DL (ref 11.7–15.5)
LDLC SERPL CALC-MCNC: 110 MG/DL (CALC)
LYMPHOCYTES # BLD AUTO: 1836 CELLS/UL (ref 850–3900)
LYMPHOCYTES NFR BLD AUTO: 54 %
MCH RBC QN AUTO: 32.6 PG (ref 27–33)
MCHC RBC AUTO-ENTMCNC: 32.3 G/DL (ref 32–36)
MCV RBC AUTO: 100.9 FL (ref 80–100)
MONOCYTES # BLD AUTO: 697 CELLS/UL (ref 200–950)
MONOCYTES NFR BLD AUTO: 20.5 %
NEUTROPHILS # BLD AUTO: 758 CELLS/UL (ref 1500–7800)
NEUTROPHILS NFR BLD AUTO: 22.3 %
NONHDLC SERPL-MCNC: 126 MG/DL (CALC)
PLATELET # BLD AUTO: 277 THOUSAND/UL (ref 140–400)
PMV BLD REES-ECKER: 11.7 FL (ref 7.5–12.5)
POTASSIUM SERPL-SCNC: 4.2 MMOL/L (ref 3.5–5.3)
PROT SERPL-MCNC: 6.4 G/DL (ref 6.1–8.1)
RBC # BLD AUTO: 3.28 MILLION/UL (ref 3.8–5.1)
SODIUM SERPL-SCNC: 141 MMOL/L (ref 135–146)
TRIGL SERPL-MCNC: 72 MG/DL
TSH SERPL-ACNC: 0.54 MIU/L (ref 0.4–4.5)
WBC # BLD AUTO: 3.4 THOUSAND/UL (ref 3.8–10.8)

## 2025-06-12 ENCOUNTER — TREATMENT (OUTPATIENT)
Facility: CLINIC | Age: 65
End: 2025-06-12
Payer: COMMERCIAL

## 2025-06-12 DIAGNOSIS — M54.50 PAIN, LOW BACK: ICD-10-CM

## 2025-06-12 DIAGNOSIS — M54.50 LUMBOSACRAL PAIN: Primary | Chronic | ICD-10-CM

## 2025-06-12 DIAGNOSIS — G89.29 LUMBOSACRAL PAIN, CHRONIC: ICD-10-CM

## 2025-06-12 DIAGNOSIS — M54.16 LUMBAR RADICULITIS: ICD-10-CM

## 2025-06-12 DIAGNOSIS — M54.50 LUMBOSACRAL PAIN, CHRONIC: ICD-10-CM

## 2025-06-12 PROCEDURE — 97110 THERAPEUTIC EXERCISES: CPT | Mod: GP

## 2025-06-12 PROCEDURE — 97140 MANUAL THERAPY 1/> REGIONS: CPT | Mod: GP

## 2025-06-12 NOTE — PROGRESS NOTES
"Physical Therapy Treatment    Patient Name: Carlotta Mitchell \"Tawanna\"  MRN: [id number]  Encounter date: 6/12/2025    Time Calculation  Start Time: 1014  Stop Time: 1057  Time Calculation (min): 43 min  PT Therapeutic Procedures Time Entry  Therapeutic Exercise Time Entry: 13  Manual Therapy Time Entry: 30    Visit # 8 of 16  Visits/Dates Authorized: 40V pt/ot   2025: MMO - NO AUTH / $3500 DED not met, then 100% COVERAGE / 40V ot/pt - 0 used / Availity [id number] / ds 4/17/25 //   CPT 11123 Not covered. 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 Had a few incidents of low back pain when bending over. Pain goes up to an 8-9/10 at times.     Pre-Treatment Symptoms:    4/ 10 low back    Objective   +R SI joint hypo with stork and fwd bend   +TTP b/l psoas and hip flexor R>L           Treatments:    Therapeutic Exercise 39212:     Sci fit bike x 7 min (level 3.0)  Prone press ups mult reps to tolerance (good stretch)  Standing QL stretch in door way  Visually reviewed child pose (clinician performed only for demonstration)  *extra time required for adjustments and review of HEP per verbal and visual review of all exercises.*    DNP  Exercises  - glute bridges x 10 sec holds x 10 reps  - Dead Bug  1.5# on LE and 2.5# on LE  -cat cow (no issues and able to tolerated on mat table with hx of knee replacement)  Pt education on newly added HEP to include dead bug + self MET (see NMR)    Manual: [id number]  Emphasis of session spent on STM and MFR to low back, b/l Psp, Qls, and R glute med.  Mobilizations grades ll-lV to b/l SI J and " "lumbar L1-L5    Did not perform  Neuro beltran: [id number]  Extensive pt education on self alignment tech for \"push and pull\" 3 x 10 sec holds followed by  Shotgun  Hip abd 3 x 10 sec  Hip add/pillow 3 x 10    Performed mult reps at beginning and later in session to ensure understanding of MET tech.      HEP / Access Codes URL: [email or web address]  Access Code: [email or web address]  URL: [email or web address]  Date: 05/08/2025  Prepared by: Mejia Valdez     Exercises  - Supine Bridge  - 2 x daily - 7 x weekly - 2 sets - 10 reps - 5-10 hold  - Supine Hip Adduction Isometric with Ball  - 2 x daily - 7 x weekly - 2 sets - 10 reps - 5-10 hold  - Clamshell with Resistance  - 2 x daily - 7 x weekly - 2 sets - 10 reps  - Sidelying Hip Abduction  - 2 x daily - 7 x weekly - 2 sets - 10 reps  - Prone Quadriceps Stretch with Strap  - 2 x daily - 7 x weekly - 2 sets - 3 reps - 30 hold  - Standing Hamstring Stretch with Step  - 2 x daily - 7 x weekly - 3 sets - 30 hold  - Hip Flexor Stretch on Step  - 2 x daily - 7 x weekly - 3 sets - 30 hold  - Standing Bilateral Gastroc Stretch with Step  - 2 x daily - 7 x weekly - 3 sets - 30 hold    Access Code: [email or web address]  URL: [email or web address]  Date: 05/27/2025  Prepared by: Mejia Valdez    Exercises  - Dead Bug  - 2 x daily - 7 x weekly - 3 sets - 10 reps    Assessment:   Pt had good response to session with greatest response to manual work with hypertonicity still noted in Qls and R glutes post session/manual.      Post-Treatment Symptoms:    Tenderness in B/L QL and R glute med    Plan:Pt to schedule additional 8 sessions for up to 16 total visits as stated in eval.    Goals:   Active       PT Problem       PT Goal 1       Start:  04/18/25    Expected End:  05/16/25       Pt will be IND with HEP for improved strength and ROM         PT Goal 2       Start:  04/18/25    Expected End:  06/06/25       Pt will improve core and LE strength to 4+/5 or greater for improved activity " "tolerance.         PT Goal 3       Start:  04/18/25    Expected End:  06/06/25       Pt will improve LE and trunk ROM to >75% pain free normative values.         PT Goal 4       Start:  04/18/25    Expected End:  06/20/25       Pt to improve Low Back Disability Questionnaire by x 1 TARA         Patient Stated Goal 1       Start:  04/18/25    Expected End:  06/20/25       Pt will be able to return to [employer] a full 18 holes with 2/10 pain or less.          Patient Stated Goal 2       Start:  04/18/25    Expected End:  06/20/25       Pt will be able to initiate walking or transfers with < 2/10 pain, without having to walk \"approx 1/2 a mile\" to lessen pain, in order to improve activity tolerance and resume active lifestyle.           "

## 2025-06-16 ENCOUNTER — APPOINTMENT (OUTPATIENT)
Dept: PHYSICAL THERAPY | Facility: CLINIC | Age: 65
End: 2025-06-16
Payer: COMMERCIAL

## 2025-06-17 ENCOUNTER — APPOINTMENT (OUTPATIENT)
Dept: DERMATOLOGY | Facility: CLINIC | Age: 65
End: 2025-06-17
Payer: COMMERCIAL

## 2025-06-17 ENCOUNTER — OFFICE VISIT (OUTPATIENT)
Dept: PRIMARY CARE | Facility: CLINIC | Age: 65
End: 2025-06-17
Payer: COMMERCIAL

## 2025-06-17 VITALS
HEIGHT: 65 IN | HEART RATE: 62 BPM | SYSTOLIC BLOOD PRESSURE: 118 MMHG | DIASTOLIC BLOOD PRESSURE: 76 MMHG | WEIGHT: 129 LBS | BODY MASS INDEX: 21.49 KG/M2 | OXYGEN SATURATION: 97 %

## 2025-06-17 DIAGNOSIS — Z00.00 ROUTINE GENERAL MEDICAL EXAMINATION AT A HEALTH CARE FACILITY: Primary | ICD-10-CM

## 2025-06-17 DIAGNOSIS — L82.1 SEBORRHEIC KERATOSIS: ICD-10-CM

## 2025-06-17 DIAGNOSIS — L81.4 LENTIGO: ICD-10-CM

## 2025-06-17 DIAGNOSIS — E78.00 ELEVATED LDL CHOLESTEROL LEVEL: ICD-10-CM

## 2025-06-17 DIAGNOSIS — L91.0 HYPERTROPHIC SCAR: ICD-10-CM

## 2025-06-17 DIAGNOSIS — Z85.828 HISTORY OF NONMELANOMA SKIN CANCER: ICD-10-CM

## 2025-06-17 DIAGNOSIS — L57.9 SKIN CHANGES DUE TO CHRONIC EXPOSURE TO NONIONIZING RADIATION: ICD-10-CM

## 2025-06-17 DIAGNOSIS — D18.01 ANGIOMA OF SKIN: ICD-10-CM

## 2025-06-17 DIAGNOSIS — D22.9 BENIGN NEVUS: ICD-10-CM

## 2025-06-17 DIAGNOSIS — D64.9 ANEMIA, UNSPECIFIED TYPE: ICD-10-CM

## 2025-06-17 DIAGNOSIS — D53.9 MACROCYTIC ANEMIA: ICD-10-CM

## 2025-06-17 PROCEDURE — 1036F TOBACCO NON-USER: CPT | Performed by: NURSE PRACTITIONER

## 2025-06-17 PROCEDURE — 1159F MED LIST DOCD IN RCRD: CPT | Performed by: FAMILY MEDICINE

## 2025-06-17 PROCEDURE — 3008F BODY MASS INDEX DOCD: CPT | Performed by: FAMILY MEDICINE

## 2025-06-17 PROCEDURE — 1159F MED LIST DOCD IN RCRD: CPT | Performed by: NURSE PRACTITIONER

## 2025-06-17 PROCEDURE — 99213 OFFICE O/P EST LOW 20 MIN: CPT | Performed by: NURSE PRACTITIONER

## 2025-06-17 PROCEDURE — 99397 PER PM REEVAL EST PAT 65+ YR: CPT | Performed by: FAMILY MEDICINE

## 2025-06-17 PROCEDURE — 99213 OFFICE O/P EST LOW 20 MIN: CPT | Performed by: FAMILY MEDICINE

## 2025-06-17 PROCEDURE — 1160F RVW MEDS BY RX/DR IN RCRD: CPT | Performed by: NURSE PRACTITIONER

## 2025-06-17 PROCEDURE — 1036F TOBACCO NON-USER: CPT | Performed by: FAMILY MEDICINE

## 2025-06-17 NOTE — PATIENT INSTRUCTIONS
Try a few weeks of over the counter Nexium     Order for a CT Calcium score     Continue the PT   We can consider an MRI if the pain returns or gets worse     Repeat colonoscopy in 2026    Tetanus booster is due in 2026,     Mammo was good on 7/22/24, repeat yearly.  New order placed by GYN    We reviewed the labs, get updated iron, Folate and B12 levels in a few months.     Continue the potassium and magnesium     Please try the trazodone as needed for insomnia as needed.     Try to drink one Protein Water per day or increase egg whites/ tuna in water/ chicken breast     Follow up in 6 months  or sooner as needed.

## 2025-06-17 NOTE — PROGRESS NOTES
"Subjective   Carlotta Mitchell \"Tawanna\" is a 64 y.o. female who presents for Annual Exam (Tawanna is here today for CPE.).    HPI  Father with SLE   - pt had nornal KAVON, sed rate on 24      #) lumbar pain with bilateral radiculitis - stable   - PT was helpful   - some leg weakness and toe numbness at rest   - order for MRI , PT did not think she needed it   - 8/10 pain with cramping in the legs max, 3/10 today   - neg HLA, Rh, KAVON and KAVON in May 2023  - has done PT in the past and does a strict low back exercise routine every AM   - progressing over the last few years   - we did xrays of the hips and lumbar spine on 23   - DJD at L3/L4, normal hip xrays  - no gross weakness,  - some tingles in the both legs , pretty consistent   - still does daily walking , sore at first and then it helps   - taking 600 mg of ibuprofen almost every day   - is getting some toes cramping   - no change in bowel movement or urination (is treated for incontinence of urine, urge)   - there are times the pain is severe and sharp     #) leukopenia- No evidence of causes of persistently low white blood count, the flow cytometry showed No immunophenotypic evidence of a lymphoproliferative disorder. No increased or abnormal blast population. No abnormality of monocytes.  Granulocytes with phenotypic atypia   - we can order the B12 and iron and folate     #) atypical chest pain   - once per week, 20-30 mins at a time   - pepcid seems to help   - had had gerd in the past   - Cscope in 2016,  no GEGD     #) hypertrophic scar - steroid injection last time   - had a basal cell skin cancer removed in   - then dehiscence and was Rx mupirocin   - scar is thick and in a cosmetic area of the chest where a low cut shirt would show   - pt agreeable to try a small kenalog injection ( 10 mg) into the scar)   #) leg swelling - better  - had vascular studies and were good     #) Incontinence and pelvic pain - no change in " "urination   - on oxybutinin and it is helping   - ovaries for RAD51 gene- Evelyn Ruggiero   - follows with uro/gyn  - also saw Dr Higgins , in the past      #) dermatitis behind the left ear- cortisone cream is keeping stable.      #) lipoma on left forearm  - no numbness tingling or weakness      #) OA on the low back and knee- stable  - follows with ortho and had PT   - trying to do home exercises / stretches   - Ibuprofen as needed, always with golf   - xrays on on 5/22/23 - No acute findings of the lumbar spine and hips. Mild degenerative changes of the lumbar spine.     #) Preventive:  Smoker: never, quit more than 15 yrs ago   Etoh: occ  BMI: 22   BP: good  118/74   Fam h/o:  - Mom- alzheimer   - Dad- UC   - Siblings  Vaccinations:  - Flu- declined   - Tetanus- 2016   - Shingles- had   - Pneumonia - n/a til 64   - COVID- yes   PAP: 2019- due 2022  Mammogram: Feb 2021   Colonoscopy: Due in 2026   DEXA: at age 64   CT Cardiac score:   ASA 81:   Low Dose CT: n/a   HepC screen: now   Fasting blood work: now   Last eye exam: needs, lasik   Last dental Exam:  Diet:  Exercise: walks daily   Mood: good   Sleep: good   Occupation: electro plating   Supplements:    ROS was completed and all systems are negative with the exception of what was noted in the the HPI.     Objective     /76 (BP Location: Left arm, Patient Position: Sitting)   Pulse 62   Ht 1.651 m (5' 5\")   Wt 58.5 kg (129 lb)   SpO2 97%   BMI 21.47 kg/m²      Last Labs:     CMP:   Lab Results   Component Value Date    CALCIUM 8.9 06/09/2025    CALCIUM 8.8 07/10/2024    CALCIUM 9.1 05/23/2023    CALCIUM 9.3 10/27/2021    PROT 6.4 06/09/2025    PROT 6.2 (L) 07/10/2024    PROT 6.3 (L) 05/23/2023    PROT 6.5 10/27/2021    ALBUMIN 4.5 06/09/2025    ALBUMIN 4.1 07/10/2024    ALBUMIN 4.3 05/23/2023    ALBUMIN 4.4 10/27/2021    AST 20 06/09/2025    AST 18 07/10/2024    AST 20 05/23/2023    AST 17 10/27/2021    ALKPHOS 36 (L) 06/09/2025    ALKPHOS 36 " "07/10/2024    ALKPHOS 41 05/23/2023    ALKPHOS 43 10/27/2021    BILITOT 0.8 06/09/2025    BILITOT 0.5 07/10/2024    BILITOT 0.7 05/23/2023    BILITOT 0.6 10/27/2021     CBC:   Lab Results   Component Value Date    WBC 3.4 (L) 06/09/2025    WBC 4.2 (L) 07/10/2024    WBC 4.0 (L) 05/23/2023    WBC 3.8 (L) 10/27/2021    HGB 10.7 (L) 06/09/2025    HGB 11.2 (L) 07/10/2024    HGB 12.0 05/23/2023    HGB 12.8 10/27/2021    HCT 33.1 (L) 06/09/2025    HCT 35.0 (L) 07/10/2024    HCT 37.2 05/23/2023    HCT 40.9 10/27/2021    .9 (H) 06/09/2025     (H) 07/10/2024    MCV 97 05/23/2023     10/27/2021     06/09/2025     07/10/2024     05/23/2023     10/27/2021     A1C:   No results found for: \"HGBA1C\"  LIPID PANEL:   Lab Results   Component Value Date    CHOL 208 (H) 06/09/2025    CHOL 203 (H) 07/10/2024    CHOL 196 05/23/2023    CHOL 217 (H) 10/27/2021    TRIG 72 06/09/2025    TRIG 69 07/10/2024    TRIG 82 05/23/2023    TRIG 62 10/27/2021    HDL 82 06/09/2025    HDL 94.8 07/10/2024    HDL 88.0 05/23/2023    HDL 88.5 10/27/2021    CHHDL 2.5 06/09/2025    CHHDL 2.1 07/10/2024    CHHDL 2.2 05/23/2023    CHHDL 2.5 10/27/2021    LDLF 92 05/23/2023    LDLF 116 (H) 10/27/2021    VLDL 14 07/10/2024    VLDL 16 05/23/2023    VLDL 12 10/27/2021    NHDL 126 06/09/2025    NHDL 108 07/10/2024     TSH:   Lab Results   Component Value Date    TSH 0.54 06/09/2025    TSH 1.03 07/10/2024    TSH 1.38 05/23/2023    TSH 1.32 10/27/2021     PSA:   No results found for: \"PSA\"    Physical Exam  GEN: A+O, no acute distress  HEENT: NC/AT, Oropharynx clear, no exudates, TM visualized, Extraoccular muscles intact, no facial droop; no thyromegaly or cervical LAD  RESP: CTAB, no wheezes   CV: RRR, no murmurs  ABD: soft, non-tender, + BS  SKIN: no rashes or bruising, no peripheral edema   NEURO: CN II-XII grossly intact, moves all extremities equally, no tremor   PSYCH: normal affect, appropriate mood "     Assessment/Plan   Problem List Items Addressed This Visit    None  Try a few weeks of over the counter Nexium     Order for a CT Calcium score     Continue the PT   We can consider an MRI if the pain returns or gets worse     Repeat colonoscopy in 2026    Tetanus booster is due in 2026,     Mammo was good on 7/22/24, repeat yearly.  New order placed by GYN    We reviewed the labs, get updated iron, Folate and B12 levels in a few months.     Continue the potassium and magnesium     Please try the trazodone as needed for insomnia as needed.     Try to drink one Protein Water per day or increase egg whites/ tuna in water/ chicken breast     Follow up in 6 months  or sooner as needed.     Adrienne Omalley DO, MSMed, ABOM  [address]   [address]   [place] [address]  Ph. [phone]  Fx. [phone]

## 2025-06-17 NOTE — PROGRESS NOTES
Subjective     Tawanna Mitchell is a 64 y.o. female who presents for the following: Skin Check.     Review of Systems:  No other skin or systemic complaints other than what is documented elsewhere in the note.    The following portions of the chart were reviewed this encounter and updated as appropriate:   Tobacco  Allergies  Meds  Problems  Med Hx  Surg Hx         Skin Cancer History  Biopsy Log Book  Biopsied Type Location Status   11/27/23 BCC Right Breast Refer for Excision  12/26/23       Additional History      Specialty Problems          Dermatology Problems    Eczema        Objective   Well appearing patient in no apparent distress; mood and affect are within normal limits.    A full examination was performed including scalp, head, eyes, ears, nose, lips, neck, chest, axillae, abdomen, back, buttocks, bilateral upper extremities, bilateral lower extremities, hands, feet, fingers, toes, fingernails, and toenails. All findings within normal limits unless otherwise noted below.      Assessment/Plan   Skin Exam  1. ANGIOMA OF SKIN  Generalized  Scattered cherry-red papule(s).  A cherry hemangioma is a small macule (small, flat, smooth area) or papule (small, solid bump) formed from an overgrowth of tiny blood vessels in the skin. Cherry hemangiomas are characteristically red or purplish in color. They often first appear in middle adulthood and usually increase in number with age. Cherry hemangiomas are noncancerous (benign) and are common in adults.    The present appearance of the lesion is not worrisome but it should continue to be observed and testing/treatment may be warranted if change occurs.  Related Procedures  Follow Up In Dermatology - Established Patient  Follow Up In Dermatology - Established Patient  2. BENIGN NEVUS  Generalized  Scattered, uniform and benign-appearing, regular brown melanocytic papules and macules.  The present appearance of the lesion is not worrisome but it should continue  to be observed and testing/treatment may be warranted if change occurs.  Related Procedures  Follow Up In Dermatology - Established Patient  Follow Up In Dermatology - Established Patient  3. SEBORRHEIC KERATOSIS (4)  Chest - Medial (Center), Generalized, Left Breast (2)  Stuck on verrucous, tan-brown papules and plaques.    Seborrheic keratoses are common noncancerous (benign) growths of unknown cause seen in adults due to a thickening of an area of the top skin layer. Seborrheic keratoses may appear as if they are stuck on to the skin. They have distinct borders, and they may appear as papules (small, solid bumps) or plaques (solid, raised patches that are bigger than a thumbnail). They may be the same color as your skin, or they may be pink, light brown, darker brown, or very dark brown, or sometimes may appear black.    There is no way to prevent new seborrheic keratoses from forming. Seborrheic keratoses can be removed, but removal is considered a cosmetic issue and is usually not covered by insurance.    PLAN  No treatment is needed unless there is irritation from clothing, such as itching or bleeding.  2.   Some lotions containing alpha hydroxy acids, salicylic acid, or urea may make the areas feel smoother with regular use but will not eliminate them.  Related Procedures  Follow Up In Dermatology - Established Patient  Follow Up In Dermatology - Established Patient  4. LENTIGO  Generalized  Scattered tan macules in sun-exposed areas.  A solar lentigo (plural, solar lentigines), also known as a sun-induced freckle or senile lentigo, is a dark (hyperpigmented) lesion caused by natural or artificial ultraviolet (UV) light. Solar lentigines may be single or multiple. This type of lentigo is different from a simple lentigo (lentigo simplex) because it is caused by exposure to UV light. Solar lentigines are benign, but they do indicate excessive sun exposure, a risk factor for the development of skin cancer.    To  prevent solar lentigines, avoid exposure to sunlight in midday (10 AM to 3 PM), wear sun-protective clothing (tightly woven clothes and hats), and apply sunscreen (SPF 30 UVA and UVB block).    The present appearance of the lesion is not worrisome but it should continue to be observed and testing/treatment may be warranted if change occurs.  Related Procedures  Follow Up In Dermatology - Established Patient  Follow Up In Dermatology - Established Patient  5. SKIN CHANGES DUE TO CHRONIC EXPOSURE TO NONIONIZING RADIATION  Generalized  Actinic changes in the form of freckles, lentigines and hyper/hypopigmentation   ABCDEs of melanoma and atypical moles were discussed with the patient.    Patient was instructed to perform monthly self skin examination.  We recommended that the patient have regular full skin exams given an increased risk of subsequent skin cancers.    The patient was instructed to use sun protective behaviors including use of broad spectrum sunscreens and sun protective clothing to reduce risk of skin cancers.    Warning signs of non-melanoma skin cancer discussed.  Related Procedures  Follow Up In Dermatology - Established Patient  Follow Up In Dermatology - Established Patient  6. HISTORY OF NONMELANOMA SKIN CANCER  Generalized  ABCDEs of melanoma and atypical moles were discussed with the patient.    Patient was instructed to perform monthly self skin examination.  We recommended that the patient have regular full skin exams given an increased risk of subsequent skin cancers.    The patient was instructed to use sun protective behaviors including use of broad spectrum sunscreens and sun protective clothing to reduce risk of skin cancers.    Warning signs of non-melanoma skin cancer discussed.  Related Procedures  Follow Up In Dermatology - Established Patient  Follow Up In Dermatology - Established Patient  7. HYPERTROPHIC SCAR  Right Breast  Thickened hypertrophic fleshed tone to pink scar, no  reoccurrence.   Softer compared to last exam. Continue with vitamin E oil and messaging.   Related Procedures  Follow Up In Dermatology - Established Patient  Follow Up In Dermatology - Established Patient      Return in 6 months for routine skin check or return to clinic sooner if needed

## 2025-06-17 NOTE — PATIENT INSTRUCTIONS

## 2025-06-18 ENCOUNTER — TREATMENT (OUTPATIENT)
Facility: CLINIC | Age: 65
End: 2025-06-18
Payer: COMMERCIAL

## 2025-06-18 DIAGNOSIS — M54.50 PAIN, LOW BACK: ICD-10-CM

## 2025-06-18 DIAGNOSIS — G89.29 LUMBOSACRAL PAIN, CHRONIC: ICD-10-CM

## 2025-06-18 DIAGNOSIS — M54.50 LUMBOSACRAL PAIN: Primary | Chronic | ICD-10-CM

## 2025-06-18 DIAGNOSIS — M54.50 LUMBOSACRAL PAIN, CHRONIC: ICD-10-CM

## 2025-06-18 DIAGNOSIS — M54.16 LUMBAR RADICULITIS: ICD-10-CM

## 2025-06-18 PROCEDURE — 97110 THERAPEUTIC EXERCISES: CPT | Mod: GP

## 2025-06-18 PROCEDURE — 97014 ELECTRIC STIMULATION THERAPY: CPT | Mod: GP

## 2025-06-18 PROCEDURE — 97140 MANUAL THERAPY 1/> REGIONS: CPT | Mod: GP

## 2025-06-18 NOTE — PROGRESS NOTES
"Physical Therapy Treatment    Patient Name: Carlotta Mitchell \"Tawanna\"  MRN: [id number]  Encounter date: 6/18/2025    Time Calculation  Start Time: 1102  Stop Time: 1147  Time Calculation (min): 45 min  PT Modalities Time Entry  E-Stim (Unattended) Time Entry: 8  PT Therapeutic Procedures Time Entry  Therapeutic Exercise Time Entry: 20  Manual Therapy Time Entry: 15    Visit # 9 of 16  Visits/Dates Authorized: 40V pt/ot   2025: [employer] - NO AUTH / $3500 DED not met, then 100% COVERAGE / 40V ot/pt - 0 used / Availity [id number] / ds 4/17/25 //   CPT 52331 Not covered. CPT 69917 req auth //    Current Problem:   Problem List Items Addressed This Visit           ICD-10-CM    Pain, low back M54.50    Lumbosacral pain, chronic - Primary M54.50, G89.29    Lumbar radiculitis (Chronic) M54.16     Precautions:   STEADI Fall Risk Score (The score of 4 or more indicates an increased risk of falling): 2           Subjective   General: Pt felt good after last session and is some noticing progress. Difficulty with performance of one of the exercises on HEP (dead bug).     Pre-Treatment Symptoms:    No number given but reports she is feeling better than last session. Discomfort remains in R SI joint region.     Objective   +R SI joint hypo with stork and fwd bend   +TTP b/l psoas and hip flexor R>L           Treatments:    Therapeutic Exercise 95416:     Sci fit bike x 7 min (level 3.0)  Prone press ups mult reps to tolerance (good stretch)   Elbows first   Progressed to hands    Quad ped  Fire hydrants x 10 reps (cues for core engagement and neutral pelvis)  Bird dog    UE only x 10   LE x 10 only  Gregg pose    - Dead Bug No added weight with UE support behind low back to provide added stability.    Manual: 71615  Emphasis of session spent on STM and MFR to low back, R Psp, Qls, and R glute med.  Mobilizations grades ll-lV to R SI J       Modalities:   Electrical Nerve Stimulation via dry needling following informed consent; 2Hz, " "mA to tolerance, applied to R glute med and piriformis, for 8 minutes. Pt positioned in prone lying prone x 1 for head support. Avoided DN to LOW BACK as pt had poor tolerance to last attempt noting pain.   DN performed following informed consent after review of all contraindications, precautions, indications.   Proper sanitization and prepping techniques utilized to prepare the region while ensuring the use of sterile single use dry needles maintaining all infection control policies followed during this visit with no adverse reaction noted. Relief reported.       Did not perform  Neuro beltran: [id number]  Extensive pt education on self alignment tech for \"push and pull\" 3 x 10 sec holds followed by  Shotgun  Hip abd 3 x 10 sec  Hip add/pillow 3 x 10    Performed mult reps at beginning and later in session to ensure understanding of MET tech.      HEP / Access Codes URL: [email or web address]  Access Code: [email or web address]  URL: [email or web address]  Date: 05/08/2025  Prepared by: Mejia Valdez     Exercises  - Supine Bridge  - 2 x daily - 7 x weekly - 2 sets - 10 reps - 5-10 hold  - Supine Hip Adduction Isometric with Ball  - 2 x daily - 7 x weekly - 2 sets - 10 reps - 5-10 hold  - Clamshell with Resistance  - 2 x daily - 7 x weekly - 2 sets - 10 reps  - Sidelying Hip Abduction  - 2 x daily - 7 x weekly - 2 sets - 10 reps  - Prone Quadriceps Stretch with Strap  - 2 x daily - 7 x weekly - 2 sets - 3 reps - 30 hold  - Standing Hamstring Stretch with Step  - 2 x daily - 7 x weekly - 3 sets - 30 hold  - Hip Flexor Stretch on Step  - 2 x daily - 7 x weekly - 3 sets - 30 hold  - Standing Bilateral Gastroc Stretch with Step  - 2 x daily - 7 x weekly - 3 sets - 30 hold    Access Code: [email or web address]  URL: [email or web address]  Date: 05/27/2025  Prepared by: Mejia Valdez    Exercises  - Dead Bug  - 2 x daily - 7 x weekly - 3 sets - 10 reps    Assessment:   Pt had good response to session with greatest response to dry " "needling and manual work with majority of session spent on updating and modifying HEP for improved hip/core exercise. Pt still benefits from addressing hypertonicity in Qls and R glutes post session/manual.      Post-Treatment Symptoms:    better    Plan: Cont with manual and DN. Follow up onHEP.     Goals:   Active       PT Problem       PT Goal 1       Start:  04/18/25    Expected End:  05/16/25       Pt will be IND with HEP for improved strength and ROM         PT Goal 2       Start:  04/18/25    Expected End:  06/06/25       Pt will improve core and LE strength to 4+/5 or greater for improved activity tolerance.         PT Goal 3       Start:  04/18/25    Expected End:  06/06/25       Pt will improve LE and trunk ROM to >75% pain free normative values.         PT Goal 4       Start:  04/18/25    Expected End:  06/20/25       Pt to improve Low Back Disability Questionnaire by x 1 TARA         Patient Stated Goal 1       Start:  04/18/25    Expected End:  06/20/25       Pt will be able to return to [employer] a full 18 holes with 2/10 pain or less.          Patient Stated Goal 2       Start:  04/18/25    Expected End:  06/20/25       Pt will be able to initiate walking or transfers with < 2/10 pain, without having to walk \"approx 1/2 a mile\" to lessen pain, in order to improve activity tolerance and resume active lifestyle.           "

## 2025-07-10 ENCOUNTER — TREATMENT (OUTPATIENT)
Facility: CLINIC | Age: 65
End: 2025-07-10
Payer: COMMERCIAL

## 2025-07-10 DIAGNOSIS — M54.16 LUMBAR RADICULITIS: ICD-10-CM

## 2025-07-10 DIAGNOSIS — G89.29 LUMBOSACRAL PAIN, CHRONIC: ICD-10-CM

## 2025-07-10 DIAGNOSIS — M54.50 LUMBOSACRAL PAIN, CHRONIC: ICD-10-CM

## 2025-07-10 DIAGNOSIS — M54.50 LUMBOSACRAL PAIN: Primary | Chronic | ICD-10-CM

## 2025-07-10 DIAGNOSIS — M54.50 PAIN, LOW BACK: ICD-10-CM

## 2025-07-10 PROCEDURE — 97110 THERAPEUTIC EXERCISES: CPT | Mod: GP

## 2025-07-10 PROCEDURE — 97014 ELECTRIC STIMULATION THERAPY: CPT | Mod: GP

## 2025-07-10 PROCEDURE — 97140 MANUAL THERAPY 1/> REGIONS: CPT | Mod: GP

## 2025-07-10 NOTE — PROGRESS NOTES
"Physical Therapy Treatment    Patient Name: Carlotta Mitchell \"Tawanna\"  MRN: [id number]  Encounter date: 7/10/2025    Time Calculation  Start Time: 1604  Stop Time: 1656  Time Calculation (min): 52 min  PT Modalities Time Entry  E-Stim (Unattended) Time Entry: 12  PT Therapeutic Procedures Time Entry  Therapeutic Exercise Time Entry: 20  Manual Therapy Time Entry: 20    Visit # 10 of 16  Visits/Dates Authorized: 40V pt/ot   2025: [employer] - NO AUTH / $3500 DED not met, then 100% COVERAGE / 40V ot/pt - 0 used / Availity [id number] / ds 4/17/25 //   CPT 56615 Not covered. CPT 72699 req auth //    Current Problem:   Problem List Items Addressed This Visit           ICD-10-CM    Pain, low back M54.50    Lumbosacral pain, chronic - Primary M54.50, G89.29    Lumbar radiculitis (Chronic) M54.16     Precautions:   STEADI Fall Risk Score (The score of 4 or more indicates an increased risk of falling): 2           Subjective   General: Pt felt good after last session and is some noticing progress. Difficulty with performance of one of the exercises on HEP (dead bug).     Pre-Treatment Symptoms:    No number given but reports she is feeling better than last session. Discomfort remains in R SI joint region.     Objective   +R SI joint hypo with stork and fwd bend   +TTP b/l psoas and hip flexor R>L           Treatments:    Therapeutic Exercise 63025:   Sci fit bike x 7 min (level 3.0)  Si joint stretch  MET education performance and issuance      Manual: 98042  Emphasis of session spent on STM and MFR to low back, R Psp, Qls, and R glute med.  Mobilizations grades ll-lV to R SI J     Side lying:  Body drop for lower lumbar and L5-S1 mobilizations grades lll-lV + HVLAT  SI joint mobilizations grades lll-lV + HVLAT    No Cavitations were audible for HVLAT    Modalities:   Electrical Nerve Stimulation via dry needling following informed consent; 4Hz, mA to tolerance, applied to R glute med and piriformis, for 12 min total 9 +3 set-up. " "Pt positioned in prone lying prone x 1 for head support.   Avoided DN to LOW BACK as pt had poor tolerance to last attempt noting pain.   DN performed following informed consent after review of all contraindications, precautions, indications.   Proper sanitization and prepping techniques utilized to prepare the region while ensuring the use of sterile single use dry needles maintaining all infection control policies followed during this visit with no adverse reaction noted. Relief reported.       Did not perform  Neuro beltran: [id number]  Extensive pt education on self alignment tech for \"push and pull\" 3 x 10 sec holds followed by  Shotgun  Hip abd 3 x 10 sec  Hip add/pillow 3 x 10    Performed mult reps at beginning and later in session to ensure understanding of MET tech.      HEP / Access Codes URL: [email or web address]:  Access Code: [email or web address]  URL: [email or web address]  Date: 05/08/2025  Prepared by: Mejia Valdez     Exercises  - Supine Bridge  - 2 x daily - 7 x weekly - 2 sets - 10 reps - 5-10 hold  - Supine Hip Adduction Isometric with Ball  - 2 x daily - 7 x weekly - 2 sets - 10 reps - 5-10 hold  - Clamshell with Resistance  - 2 x daily - 7 x weekly - 2 sets - 10 reps  - Sidelying Hip Abduction  - 2 x daily - 7 x weekly - 2 sets - 10 reps  - Prone Quadriceps Stretch with Strap  - 2 x daily - 7 x weekly - 2 sets - 3 reps - 30 hold  - Standing Hamstring Stretch with Step  - 2 x daily - 7 x weekly - 3 sets - 30 hold  - Hip Flexor Stretch on Step  - 2 x daily - 7 x weekly - 3 sets - 30 hold  - Standing Bilateral Gastroc Stretch with Step  - 2 x daily - 7 x weekly - 3 sets - 30 hold    Access Code: [email or web address]  URL: [email or web address]  Date: 05/27/2025  Prepared by: Mejia Valdez    Exercises  - Dead Bug  - 2 x daily - 7 x weekly - 3 sets - 10 reps    Assessment:   Pt had good response to session with greatest response to dry needling and manual work with majority of session spent on updating and " "modifying HEP for improved hip/core exercise. Pt still benefits from addressing hypertonicity in Qls and R glutes post session/manual.      Post-Treatment Symptoms:    better    Plan: Cont with manual and DN. Follow up onHEP.     Goals:   Active       PT Problem       PT Goal 1       Start:  04/18/25    Expected End:  05/16/25       Pt will be IND with HEP for improved strength and ROM         PT Goal 2       Start:  04/18/25    Expected End:  06/06/25       Pt will improve core and LE strength to 4+/5 or greater for improved activity tolerance.         PT Goal 3       Start:  04/18/25    Expected End:  06/06/25       Pt will improve LE and trunk ROM to >75% pain free normative values.         PT Goal 4       Start:  04/18/25    Expected End:  06/20/25       Pt to improve Low Back Disability Questionnaire by x 1 TARA         Patient Stated Goal 1       Start:  04/18/25    Expected End:  06/20/25       Pt will be able to return to [employer] a full 18 holes with 2/10 pain or less.          Patient Stated Goal 2       Start:  04/18/25    Expected End:  06/20/25       Pt will be able to initiate walking or transfers with < 2/10 pain, without having to walk \"approx 1/2 a mile\" to lessen pain, in order to improve activity tolerance and resume active lifestyle.           "

## 2025-07-15 ENCOUNTER — APPOINTMENT (OUTPATIENT)
Facility: CLINIC | Age: 65
End: 2025-07-15
Payer: COMMERCIAL

## 2025-07-15 DIAGNOSIS — M54.50 LUMBOSACRAL PAIN: Primary | Chronic | ICD-10-CM

## 2025-07-15 DIAGNOSIS — M54.16 LUMBAR RADICULITIS: ICD-10-CM

## 2025-07-23 ENCOUNTER — APPOINTMENT (OUTPATIENT)
Dept: RADIOLOGY | Facility: HOSPITAL | Age: 65
End: 2025-07-23
Payer: COMMERCIAL

## 2025-07-23 ENCOUNTER — APPOINTMENT (OUTPATIENT)
Facility: CLINIC | Age: 65
End: 2025-07-23
Payer: COMMERCIAL

## 2025-07-23 DIAGNOSIS — M54.16 LUMBAR RADICULITIS: ICD-10-CM

## 2025-07-23 DIAGNOSIS — M54.50 LUMBOSACRAL PAIN: Primary | Chronic | ICD-10-CM

## 2025-07-23 DIAGNOSIS — E78.00 ELEVATED LDL CHOLESTEROL LEVEL: ICD-10-CM

## 2025-07-23 PROCEDURE — 75571 CT HRT W/O DYE W/CA TEST: CPT

## 2025-07-24 ENCOUNTER — APPOINTMENT (OUTPATIENT)
Dept: RADIOLOGY | Facility: HOSPITAL | Age: 65
End: 2025-07-24
Payer: COMMERCIAL

## 2025-07-24 ENCOUNTER — HOSPITAL ENCOUNTER (OUTPATIENT)
Dept: RADIOLOGY | Facility: HOSPITAL | Age: 65
Discharge: HOME | End: 2025-07-24
Payer: COMMERCIAL

## 2025-07-24 VITALS — WEIGHT: 129 LBS | BODY MASS INDEX: 21.49 KG/M2 | HEIGHT: 65 IN

## 2025-07-24 DIAGNOSIS — Z12.31 BREAST CANCER SCREENING BY MAMMOGRAM: ICD-10-CM

## 2025-07-24 PROCEDURE — 77067 SCR MAMMO BI INCL CAD: CPT | Performed by: RADIOLOGY

## 2025-07-24 PROCEDURE — 77063 BREAST TOMOSYNTHESIS BI: CPT | Performed by: RADIOLOGY

## 2025-07-24 PROCEDURE — 77067 SCR MAMMO BI INCL CAD: CPT

## 2025-07-30 ENCOUNTER — TREATMENT (OUTPATIENT)
Facility: CLINIC | Age: 65
End: 2025-07-30
Payer: COMMERCIAL

## 2025-07-30 DIAGNOSIS — M54.50 PAIN, LOW BACK: ICD-10-CM

## 2025-07-30 DIAGNOSIS — M54.50 LUMBOSACRAL PAIN, CHRONIC: ICD-10-CM

## 2025-07-30 DIAGNOSIS — G89.29 LUMBOSACRAL PAIN, CHRONIC: ICD-10-CM

## 2025-07-30 DIAGNOSIS — M54.16 LUMBAR RADICULITIS: ICD-10-CM

## 2025-07-30 DIAGNOSIS — M54.50 LUMBOSACRAL PAIN: Primary | Chronic | ICD-10-CM

## 2025-07-30 PROCEDURE — 97014 ELECTRIC STIMULATION THERAPY: CPT | Mod: GP

## 2025-07-30 PROCEDURE — 97110 THERAPEUTIC EXERCISES: CPT | Mod: GP

## 2025-07-30 ASSESSMENT — PAIN SCALES - GENERAL: PAINLEVEL_OUTOF10: 3

## 2025-07-30 ASSESSMENT — PAIN - FUNCTIONAL ASSESSMENT: PAIN_FUNCTIONAL_ASSESSMENT: 0-10

## 2025-07-30 NOTE — PROGRESS NOTES
"Physical Therapy Treatment    Patient Name: Carlotta Mitchell \"Tawanna\"  MRN: [id number]  Encounter date: 7/30/2025 PT Received On: 07/30/25  Response to Previous Treatment: Patient with no complaints from previous session.  Time Calculation  Start Time: 0932  Stop Time: 1014  Time Calculation (min): 42 min  PT Modalities Time Entry  E-Stim (Unattended) Time Entry: 12  PT Therapeutic Procedures Time Entry  Therapeutic Exercise Time Entry: 30    Visit # 11 of 16  Visits/Dates Authorized: 40V pt/ot   2025: [employer] - NO AUTH / $3500 DED not met, then 100% COVERAGE / 40V ot/pt - 0 used / Availity [id number] / ds 4/17/25 //   CPT 86586 Not covered. CPT 19386 req auth //    Current Problem:   Problem List Items Addressed This Visit           ICD-10-CM    Pain, low back M54.50    Lumbosacral pain, chronic - Primary M54.50, G89.29    Lumbar radiculitis (Chronic) M54.16     Precautions:   STEADI Fall Risk Score (The score of 4 or more indicates an increased risk of falling): 2           Subjective   General: Pt feels like she is getting better and should be able to reduce frequency in PT. Pt wants to look at her schedule first.      Pre-Treatment Symptoms:   Pain Assessment: 0-10  0-10 (Numeric) Pain Score: 3        Objective   +TTP R gluteal musculature with insertion of needle causing trigger point related muscle twitch.        Treatments:    Therapeutic Exercise 49866:   Sci fit bike x 6 min (level 4.0)    Advanced TE for emphasis on LE and core strengthening  [place] equipment:  -HS curl machine 3 sets x 10 reps    55# for first set   45# for 2nd and 3rd  -Knee ext machine   2x10 reps at 25#  -leg press    2x 10 at 20#   -heel/calf raise on leg press 20#    Performed x 1 set to fatigue    DNP  Si joint stretch  MET education performance and issuance    Modalities:   Electrical Nerve Stimulation via dry needling following informed consent; 4Hz, mA to tolerance, applied to R glute med and piriformis, for 12 min total 8 +4 " set-up. Pt positioned in side lying x 1 pillow for head support.   Avoided DN to LOW BACK per pt request.    DN performed following informed consent after review of all contraindications, precautions, indications.   Proper sanitization and prepping techniques utilized to prepare the region while ensuring the use of sterile single use dry needles maintaining all infection control policies followed during this visit with no adverse reaction noted. Relief reported.         HEP / Access Codes URL: [email or web address]:  Access Code: [email or web address]  URL: [email or web address]  Date: 05/08/2025  Prepared by: Mejia Valdez     Exercises  - Supine Bridge  - 2 x daily - 7 x weekly - 2 sets - 10 reps - 5-10 hold  - Supine Hip Adduction Isometric with Ball  - 2 x daily - 7 x weekly - 2 sets - 10 reps - 5-10 hold  - Clamshell with Resistance  - 2 x daily - 7 x weekly - 2 sets - 10 reps  - Sidelying Hip Abduction  - 2 x daily - 7 x weekly - 2 sets - 10 reps  - Prone Quadriceps Stretch with Strap  - 2 x daily - 7 x weekly - 2 sets - 3 reps - 30 hold  - Standing Hamstring Stretch with Step  - 2 x daily - 7 x weekly - 3 sets - 30 hold  - Hip Flexor Stretch on Step  - 2 x daily - 7 x weekly - 3 sets - 30 hold  - Standing Bilateral Gastroc Stretch with Step  - 2 x daily - 7 x weekly - 3 sets - 30 hold    Access Code: [email or web address]  URL: [email or web address]  Date: 05/27/2025  Prepared by: Mejia Valdez    Exercises  - Dead Bug  - 2 x daily - 7 x weekly - 3 sets - 10 reps    Assessment:   Pt had good response to session with good tolerance to advancements in strength training and therapeutic loads for lumbar/core/LE musculature. Cont to have greatest response to ESTIM and DN. Pt was open to trialing more advanced exercises on machines and discussed option to join wellness center/[facility].      Post-Treatment Symptoms:    Appropriately challenged.    Plan: advance to tolerance. Try adding [place] press for  "HEP.    Goals:   Active       PT Problem       PT Goal 1       Start:  04/18/25    Expected End:  05/16/25       Pt will be IND with HEP for improved strength and ROM         PT Goal 2       Start:  04/18/25    Expected End:  06/06/25       Pt will improve core and LE strength to 4+/5 or greater for improved activity tolerance.         PT Goal 3       Start:  04/18/25    Expected End:  06/06/25       Pt will improve LE and trunk ROM to >75% pain free normative values.         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          Patient Stated Goal 2       Start:  04/18/25    Expected End:  06/20/25       Pt will be able to initiate walking or transfers with < 2/10 pain, without having to walk \"approx 1/2 a mile\" to lessen pain, in order to improve activity tolerance and resume active lifestyle.           "

## 2025-12-17 ENCOUNTER — APPOINTMENT (OUTPATIENT)
Dept: DERMATOLOGY | Facility: CLINIC | Age: 65
End: 2025-12-17
Payer: COMMERCIAL

## 2025-12-18 ENCOUNTER — APPOINTMENT (OUTPATIENT)
Dept: PRIMARY CARE | Facility: CLINIC | Age: 65
End: 2025-12-18
Payer: COMMERCIAL